# Patient Record
Sex: MALE | Race: WHITE | Employment: FULL TIME | URBAN - METROPOLITAN AREA
[De-identification: names, ages, dates, MRNs, and addresses within clinical notes are randomized per-mention and may not be internally consistent; named-entity substitution may affect disease eponyms.]

---

## 2022-01-14 ENCOUNTER — APPOINTMENT (OUTPATIENT)
Dept: CT IMAGING | Age: 57
End: 2022-01-14
Payer: COMMERCIAL

## 2022-01-14 ENCOUNTER — HOSPITAL ENCOUNTER (EMERGENCY)
Age: 57
Discharge: HOME | End: 2022-01-14
Payer: COMMERCIAL

## 2022-01-14 VITALS
HEART RATE: 90 BPM | RESPIRATION RATE: 16 BRPM | TEMPERATURE: 97.8 F | DIASTOLIC BLOOD PRESSURE: 88 MMHG | BODY MASS INDEX: 39.2 KG/M2 | SYSTOLIC BLOOD PRESSURE: 168 MMHG | HEIGHT: 71 IN | OXYGEN SATURATION: 98 % | WEIGHT: 280 LBS

## 2022-01-14 DIAGNOSIS — M79.18 ABDOMINAL MUSCLE PAIN: ICD-10-CM

## 2022-01-14 DIAGNOSIS — M79.89 MUSCLE SWELLING: Primary | ICD-10-CM

## 2022-01-14 LAB
ALBUMIN SERPL-MCNC: 3.7 G/DL (ref 3.5–5.2)
ALP BLD-CCNC: 79 U/L (ref 40–129)
ALT SERPL-CCNC: 22 U/L (ref 0–40)
ANION GAP SERPL CALCULATED.3IONS-SCNC: 11 MMOL/L (ref 7–16)
AST SERPL-CCNC: 20 U/L (ref 0–39)
BACTERIA: ABNORMAL /HPF
BASOPHILS ABSOLUTE: 0.04 E9/L (ref 0–0.2)
BASOPHILS RELATIVE PERCENT: 0.4 % (ref 0–2)
BILIRUB SERPL-MCNC: 0.3 MG/DL (ref 0–1.2)
BILIRUBIN URINE: NEGATIVE
BLOOD, URINE: NEGATIVE
BUN BLDV-MCNC: 8 MG/DL (ref 6–20)
CALCIUM SERPL-MCNC: 8.7 MG/DL (ref 8.6–10.2)
CHLORIDE BLD-SCNC: 102 MMOL/L (ref 98–107)
CLARITY: CLEAR
CO2: 24 MMOL/L (ref 22–29)
COLOR: YELLOW
CREAT SERPL-MCNC: 0.7 MG/DL (ref 0.7–1.2)
CRYSTALS, UA: ABNORMAL /HPF
EOSINOPHILS ABSOLUTE: 0.07 E9/L (ref 0.05–0.5)
EOSINOPHILS RELATIVE PERCENT: 0.7 % (ref 0–6)
EPITHELIAL CELLS, UA: ABNORMAL /HPF
GFR AFRICAN AMERICAN: >60
GFR NON-AFRICAN AMERICAN: >60 ML/MIN/1.73
GLUCOSE BLD-MCNC: 173 MG/DL (ref 74–99)
GLUCOSE URINE: 250 MG/DL
HCT VFR BLD CALC: 45.2 % (ref 37–54)
HEMOGLOBIN: 15.6 G/DL (ref 12.5–16.5)
IMMATURE GRANULOCYTES #: 0.06 E9/L
IMMATURE GRANULOCYTES %: 0.6 % (ref 0–5)
KETONES, URINE: NEGATIVE MG/DL
LEUKOCYTE ESTERASE, URINE: NEGATIVE
LIPASE: 15 U/L (ref 13–60)
LYMPHOCYTES ABSOLUTE: 2.77 E9/L (ref 1.5–4)
LYMPHOCYTES RELATIVE PERCENT: 26.1 % (ref 20–42)
MCH RBC QN AUTO: 29.5 PG (ref 26–35)
MCHC RBC AUTO-ENTMCNC: 34.5 % (ref 32–34.5)
MCV RBC AUTO: 85.4 FL (ref 80–99.9)
MONOCYTES ABSOLUTE: 0.83 E9/L (ref 0.1–0.95)
MONOCYTES RELATIVE PERCENT: 7.8 % (ref 2–12)
NEUTROPHILS ABSOLUTE: 6.86 E9/L (ref 1.8–7.3)
NEUTROPHILS RELATIVE PERCENT: 64.4 % (ref 43–80)
NITRITE, URINE: NEGATIVE
PDW BLD-RTO: 12.8 FL (ref 11.5–15)
PH UA: 6 (ref 5–9)
PLATELET # BLD: 211 E9/L (ref 130–450)
PMV BLD AUTO: 12.2 FL (ref 7–12)
POTASSIUM REFLEX MAGNESIUM: 3.7 MMOL/L (ref 3.5–5)
PROTEIN UA: 30 MG/DL
RBC # BLD: 5.29 E12/L (ref 3.8–5.8)
RBC UA: ABNORMAL /HPF (ref 0–2)
REASON FOR REJECTION: NORMAL
REJECTED TEST: NORMAL
SODIUM BLD-SCNC: 137 MMOL/L (ref 132–146)
SPECIFIC GRAVITY UA: >=1.03 (ref 1–1.03)
TOTAL CK: 181 U/L (ref 20–200)
TOTAL PROTEIN: 6.2 G/DL (ref 6.4–8.3)
UROBILINOGEN, URINE: 0.2 E.U./DL
WBC # BLD: 10.6 E9/L (ref 4.5–11.5)
WBC UA: ABNORMAL /HPF (ref 0–5)

## 2022-01-14 PROCEDURE — 82550 ASSAY OF CK (CPK): CPT

## 2022-01-14 PROCEDURE — 81001 URINALYSIS AUTO W/SCOPE: CPT

## 2022-01-14 PROCEDURE — 6360000004 HC RX CONTRAST MEDICATION: Performed by: RADIOLOGY

## 2022-01-14 PROCEDURE — 83690 ASSAY OF LIPASE: CPT

## 2022-01-14 PROCEDURE — 36415 COLL VENOUS BLD VENIPUNCTURE: CPT

## 2022-01-14 PROCEDURE — 80053 COMPREHEN METABOLIC PANEL: CPT

## 2022-01-14 PROCEDURE — 99282 EMERGENCY DEPT VISIT SF MDM: CPT

## 2022-01-14 PROCEDURE — 74177 CT ABD & PELVIS W/CONTRAST: CPT

## 2022-01-14 PROCEDURE — 85025 COMPLETE CBC W/AUTO DIFF WBC: CPT

## 2022-01-14 PROCEDURE — 96360 HYDRATION IV INFUSION INIT: CPT

## 2022-01-14 PROCEDURE — 2580000003 HC RX 258: Performed by: PHYSICIAN ASSISTANT

## 2022-01-14 RX ORDER — 0.9 % SODIUM CHLORIDE 0.9 %
1000 INTRAVENOUS SOLUTION INTRAVENOUS ONCE
Status: COMPLETED | OUTPATIENT
Start: 2022-01-14 | End: 2022-01-14

## 2022-01-14 RX ADMIN — IOPAMIDOL 75 ML: 755 INJECTION, SOLUTION INTRAVENOUS at 22:35

## 2022-01-14 RX ADMIN — SODIUM CHLORIDE 1000 ML: 9 INJECTION, SOLUTION INTRAVENOUS at 20:32

## 2022-01-14 ASSESSMENT — PAIN DESCRIPTION - LOCATION: LOCATION: ABDOMEN

## 2022-01-14 ASSESSMENT — PAIN DESCRIPTION - PAIN TYPE: TYPE: ACUTE PAIN

## 2022-01-14 ASSESSMENT — PAIN SCALES - GENERAL: PAINLEVEL_OUTOF10: 1

## 2022-01-14 ASSESSMENT — PAIN DESCRIPTION - ORIENTATION: ORIENTATION: LEFT

## 2022-01-14 ASSESSMENT — PAIN DESCRIPTION - FREQUENCY: FREQUENCY: INTERMITTENT

## 2022-01-14 ASSESSMENT — PAIN DESCRIPTION - DESCRIPTORS: DESCRIPTORS: BURNING

## 2022-01-15 NOTE — ED PROVIDER NOTES
been reviewed. Allergies: Patient has no known allergies.     -------------------------------------------------- RESULTS -------------------------------------------------  All laboratory and radiology results have been personally reviewed by myself   LABS:  Results for orders placed or performed during the hospital encounter of 01/14/22   CBC auto differential   Result Value Ref Range    WBC 10.6 4.5 - 11.5 E9/L    RBC 5.29 3.80 - 5.80 E12/L    Hemoglobin 15.6 12.5 - 16.5 g/dL    Hematocrit 45.2 37.0 - 54.0 %    MCV 85.4 80.0 - 99.9 fL    MCH 29.5 26.0 - 35.0 pg    MCHC 34.5 32.0 - 34.5 %    RDW 12.8 11.5 - 15.0 fL    Platelets 932 881 - 785 E9/L    MPV 12.2 (H) 7.0 - 12.0 fL    Neutrophils % 64.4 43.0 - 80.0 %    Immature Granulocytes % 0.6 0.0 - 5.0 %    Lymphocytes % 26.1 20.0 - 42.0 %    Monocytes % 7.8 2.0 - 12.0 %    Eosinophils % 0.7 0.0 - 6.0 %    Basophils % 0.4 0.0 - 2.0 %    Neutrophils Absolute 6.86 1.80 - 7.30 E9/L    Immature Granulocytes # 0.06 E9/L    Lymphocytes Absolute 2.77 1.50 - 4.00 E9/L    Monocytes Absolute 0.83 0.10 - 0.95 E9/L    Eosinophils Absolute 0.07 0.05 - 0.50 E9/L    Basophils Absolute 0.04 0.00 - 0.20 E9/L   Urinalysis with Microscopic   Result Value Ref Range    Color, UA Yellow Straw/Yellow    Clarity, UA Clear Clear    Glucose, Ur 250 (A) Negative mg/dL    Bilirubin Urine Negative Negative    Ketones, Urine Negative Negative mg/dL    Specific Gravity, UA >=1.030 1.005 - 1.030    Blood, Urine Negative Negative    pH, UA 6.0 5.0 - 9.0    Protein, UA 30 (A) Negative mg/dL    Urobilinogen, Urine 0.2 <2.0 E.U./dL    Nitrite, Urine Negative Negative    Leukocyte Esterase, Urine Negative Negative    WBC, UA NONE 0 - 5 /HPF    RBC, UA NONE 0 - 2 /HPF    Epithelial Cells, UA RARE /HPF    Bacteria, UA NONE SEEN None Seen /HPF    Crystals, UA Few (A) None Seen /HPF   Comprehensive Metabolic Panel w/ Reflex to MG   Result Value Ref Range    Sodium 137 132 - 146 mmol/L    Potassium reflex Magnesium 3.7 3.5 - 5.0 mmol/L    Chloride 102 98 - 107 mmol/L    CO2 24 22 - 29 mmol/L    Anion Gap 11 7 - 16 mmol/L    Glucose 173 (H) 74 - 99 mg/dL    BUN 8 6 - 20 mg/dL    CREATININE 0.7 0.7 - 1.2 mg/dL    GFR Non-African American >60 >=60 mL/min/1.73    GFR African American >60     Calcium 8.7 8.6 - 10.2 mg/dL    Total Protein 6.2 (L) 6.4 - 8.3 g/dL    Albumin 3.7 3.5 - 5.2 g/dL    Total Bilirubin 0.3 0.0 - 1.2 mg/dL    Alkaline Phosphatase 79 40 - 129 U/L    ALT 22 0 - 40 U/L    AST 20 0 - 39 U/L   Lipase   Result Value Ref Range    Lipase 15 13 - 60 U/L   CK   Result Value Ref Range    Total  20 - 200 U/L   SPECIMEN REJECTION   Result Value Ref Range    Rejected Test cmp,ck,lipas     Reason for Rejection see below        RADIOLOGY:  Interpreted by Radiologist.  CT ABDOMEN PELVIS W IV CONTRAST Additional Contrast? None   Final Result   No acute abnormality identified. RECOMMENDATIONS:   Unavailable             ------------------------- NURSING NOTES AND VITALS REVIEWED ---------------------------   The nursing notes within the ED encounter and vital signs as below have been reviewed. BP (!) 168/88   Pulse 90   Temp 97.8 °F (36.6 °C) (Infrared)   Resp 16   Ht 5' 11\" (1.803 m)   Wt 280 lb (127 kg)   SpO2 98%   BMI 39.05 kg/m²   Oxygen Saturation Interpretation: Normal      ---------------------------------------------------PHYSICAL EXAM--------------------------------------      Constitutional/General: Alert and oriented x3, stable appearing, non toxic in NAD  Head: Normocephalic and atraumatic  Eyes: PERRL, EOMI, conjunctiva pink, no scleral icterus. Mouth: Oropharynx clear, handling secretions, no trismus  Neck: Supple, full ROM  Pulmonary: Lungs clear to auscultation bilaterally, no wheezes, rales, or rhonchi.  Not in respiratory distress  Cardiovascular:  Regular rate and rhythm, 2+ distal pulses  Abdomen: Soft, obese, on visual inspection from front of abdomen, noticeable area of STS from left lateral side of abdomen in comparison to right. Area with minimal local tenderness, no palpable mass. No CVA tenderness posteriorly bilaterally. No tenderness to mid suprapubic area or groin region. Extremities: Moves all extremities x 4. No pitting edema to bilateral ankles. Warm and well perfused  Skin: warm and dry without rash  Neurologic: GCS 15  Psych: Normal Affect    ------------------------------ ED COURSE/MEDICAL DECISION MAKING----------------------  Medications   0.9 % sodium chloride bolus (0 mLs IntraVENous Stopped 1/14/22 2126)   iopamidol (ISOVUE-370) 76 % injection 75 mL (75 mLs IntraVENous Given 1/14/22 2235)       ED COURSE:       Medical Decision Making:    Patient to ER, complaints of swelling to left lateral abdomen, patient recalls onset since December when [place] Patient advised of need to check labs as well as urine and CT imaging of his abdomen and pelvis. Patient having no issues with his bowel or bladder. No problems with flank pain or visible blood in his urine. Patient advised of stable labs and urine, he does admit to eating 8 italian cookies prior to coming into ER as he had nothing to eat or drink all day. Patient awaiting CT to be completed. Patient lab had to be redrawn as it was hemolyzed. Patient otherwise resting comfortably, no complaints of pain. Patient CT results noted and stable, reviewed with ER attending Dr. Danielle Santiago who saw patient. Appears that with patient recent large amount of weight loss, most likely patient loss of weight, needs to work on core of muscle of his abdomen to firm of the muscle and most likely he most likely strained the muscles of his lateral abdomen and this is why the area is swollen, but patient advised of stable CT findings.   Patient to see PCP for follow up and workers compensation paperwork presented and filled out for patient and patient ok to work full duty.      Counseling: The emergency provider has spoken with the patient and discussed todays results, in addition to providing specific details for the plan of care and counseling regarding the diagnosis and prognosis. Questions are answered at this time and they are agreeable with the plan.      --------------------------------- IMPRESSION AND DISPOSITION ---------------------------------    IMPRESSION  1. Muscle swelling    2. Abdominal muscle pain        DISPOSITION  Disposition: Discharge to home  Patient condition is stable      NOTE: This report was transcribed using voice recognition software.  Every effort was made to ensure accuracy; however, inadvertent computerized transcription errors may be present       Malcolm Monroe PA-C  01/14/22 6168

## 2022-01-30 PROCEDURE — 99284 EMERGENCY DEPT VISIT MOD MDM: CPT

## 2022-01-30 PROCEDURE — 96365 THER/PROPH/DIAG IV INF INIT: CPT

## 2022-01-31 ENCOUNTER — APPOINTMENT (OUTPATIENT)
Dept: GENERAL RADIOLOGY | Age: 57
DRG: 918 | End: 2022-01-31

## 2022-01-31 ENCOUNTER — APPOINTMENT (OUTPATIENT)
Dept: CT IMAGING | Age: 57
DRG: 918 | End: 2022-01-31

## 2022-01-31 ENCOUNTER — HOSPITAL ENCOUNTER (INPATIENT)
Age: 57
LOS: 1 days | Discharge: HOME | DRG: 918 | End: 2022-01-31
Attending: EMERGENCY MEDICINE | Admitting: FAMILY MEDICINE

## 2022-01-31 VITALS
HEART RATE: 67 BPM | OXYGEN SATURATION: 99 % | SYSTOLIC BLOOD PRESSURE: 143 MMHG | WEIGHT: 290 LBS | RESPIRATION RATE: 18 BRPM | DIASTOLIC BLOOD PRESSURE: 81 MMHG | HEIGHT: 71 IN | BODY MASS INDEX: 40.6 KG/M2 | TEMPERATURE: 97.9 F

## 2022-01-31 DIAGNOSIS — I48.92 ATRIAL FLUTTER WITH RAPID VENTRICULAR RESPONSE (HCC): ICD-10-CM

## 2022-01-31 DIAGNOSIS — T50.901A ACCIDENTAL DRUG INGESTION, INITIAL ENCOUNTER: ICD-10-CM

## 2022-01-31 DIAGNOSIS — M25.512 ACUTE PAIN OF LEFT SHOULDER: Primary | ICD-10-CM

## 2022-01-31 DIAGNOSIS — R20.0 NUMBNESS: ICD-10-CM

## 2022-01-31 PROBLEM — I48.91 ATRIAL FIBRILLATION WITH RVR (HCC): Status: ACTIVE | Noted: 2022-01-31

## 2022-01-31 LAB
ALBUMIN SERPL-MCNC: 4.3 G/DL (ref 3.5–5.2)
ALP BLD-CCNC: 95 U/L (ref 40–129)
ALT SERPL-CCNC: 20 U/L (ref 0–40)
AMPHETAMINE SCREEN, URINE: POSITIVE
ANION GAP SERPL CALCULATED.3IONS-SCNC: 14 MMOL/L (ref 7–16)
APTT: 32.4 SEC (ref 24.5–35.1)
AST SERPL-CCNC: 21 U/L (ref 0–39)
BARBITURATE SCREEN URINE: NOT DETECTED
BASOPHILS ABSOLUTE: 0.06 E9/L (ref 0–0.2)
BASOPHILS RELATIVE PERCENT: 0.5 % (ref 0–2)
BENZODIAZEPINE SCREEN, URINE: NOT DETECTED
BILIRUB SERPL-MCNC: 0.6 MG/DL (ref 0–1.2)
BUN BLDV-MCNC: 9 MG/DL (ref 6–20)
CALCIUM SERPL-MCNC: 9.7 MG/DL (ref 8.6–10.2)
CANNABINOID SCREEN URINE: POSITIVE
CHLORIDE BLD-SCNC: 96 MMOL/L (ref 98–107)
CO2: 25 MMOL/L (ref 22–29)
COCAINE METABOLITE SCREEN URINE: NOT DETECTED
CREAT SERPL-MCNC: 0.7 MG/DL (ref 0.7–1.2)
D DIMER: <200 NG/ML DDU
EKG ATRIAL RATE: 267 BPM
EKG ATRIAL RATE: 50 BPM
EKG P AXIS: -94 DEGREES
EKG P AXIS: 11 DEGREES
EKG P-R INTERVAL: 124 MS
EKG Q-T INTERVAL: 358 MS
EKG Q-T INTERVAL: 478 MS
EKG QRS DURATION: 92 MS
EKG QRS DURATION: 98 MS
EKG QTC CALCULATION (BAZETT): 435 MS
EKG QTC CALCULATION (BAZETT): 537 MS
EKG R AXIS: -69 DEGREES
EKG R AXIS: 65 DEGREES
EKG T AXIS: -19 DEGREES
EKG T AXIS: 62 DEGREES
EKG VENTRICULAR RATE: 135 BPM
EKG VENTRICULAR RATE: 50 BPM
EOSINOPHILS ABSOLUTE: 0.09 E9/L (ref 0.05–0.5)
EOSINOPHILS RELATIVE PERCENT: 0.8 % (ref 0–6)
FENTANYL SCREEN, URINE: NOT DETECTED
GFR AFRICAN AMERICAN: >60
GFR NON-AFRICAN AMERICAN: >60 ML/MIN/1.73
GLUCOSE BLD-MCNC: 191 MG/DL (ref 74–99)
HCT VFR BLD CALC: 50.3 % (ref 37–54)
HEMOGLOBIN: 17 G/DL (ref 12.5–16.5)
IMMATURE GRANULOCYTES #: 0.05 E9/L
IMMATURE GRANULOCYTES %: 0.4 % (ref 0–5)
INR BLD: 1.1
LV EF: 60 %
LVEF MODALITY: NORMAL
LYMPHOCYTES ABSOLUTE: 3.03 E9/L (ref 1.5–4)
LYMPHOCYTES RELATIVE PERCENT: 25.3 % (ref 20–42)
Lab: ABNORMAL
MCH RBC QN AUTO: 28.9 PG (ref 26–35)
MCHC RBC AUTO-ENTMCNC: 33.8 % (ref 32–34.5)
MCV RBC AUTO: 85.4 FL (ref 80–99.9)
METHADONE SCREEN, URINE: NOT DETECTED
MONOCYTES ABSOLUTE: 0.92 E9/L (ref 0.1–0.95)
MONOCYTES RELATIVE PERCENT: 7.7 % (ref 2–12)
NEUTROPHILS ABSOLUTE: 7.83 E9/L (ref 1.8–7.3)
NEUTROPHILS RELATIVE PERCENT: 65.3 % (ref 43–80)
OPIATE SCREEN URINE: NOT DETECTED
OXYCODONE URINE: NOT DETECTED
PDW BLD-RTO: 12.5 FL (ref 11.5–15)
PHENCYCLIDINE SCREEN URINE: NOT DETECTED
PLATELET # BLD: 232 E9/L (ref 130–450)
PMV BLD AUTO: 10.8 FL (ref 7–12)
POTASSIUM SERPL-SCNC: 3.8 MMOL/L (ref 3.5–5)
PROTHROMBIN TIME: 11.8 SEC (ref 9.3–12.4)
RBC # BLD: 5.89 E12/L (ref 3.8–5.8)
SARS-COV-2, NAAT: NOT DETECTED
SODIUM BLD-SCNC: 135 MMOL/L (ref 132–146)
TOTAL PROTEIN: 7.8 G/DL (ref 6.4–8.3)
TROPONIN, HIGH SENSITIVITY: 23 NG/L (ref 0–11)
WBC # BLD: 12 E9/L (ref 4.5–11.5)

## 2022-01-31 PROCEDURE — 2500000003 HC RX 250 WO HCPCS: Performed by: NURSE PRACTITIONER

## 2022-01-31 PROCEDURE — 93005 ELECTROCARDIOGRAM TRACING: CPT | Performed by: INTERNAL MEDICINE

## 2022-01-31 PROCEDURE — 2580000003 HC RX 258: Performed by: FAMILY MEDICINE

## 2022-01-31 PROCEDURE — 99284 EMERGENCY DEPT VISIT MOD MDM: CPT | Performed by: INTERNAL MEDICINE

## 2022-01-31 PROCEDURE — 85610 PROTHROMBIN TIME: CPT

## 2022-01-31 PROCEDURE — 84484 ASSAY OF TROPONIN QUANT: CPT

## 2022-01-31 PROCEDURE — 6360000002 HC RX W HCPCS: Performed by: FAMILY MEDICINE

## 2022-01-31 PROCEDURE — 71045 X-RAY EXAM CHEST 1 VIEW: CPT

## 2022-01-31 PROCEDURE — 6370000000 HC RX 637 (ALT 250 FOR IP): Performed by: INTERNAL MEDICINE

## 2022-01-31 PROCEDURE — 6360000004 HC RX CONTRAST MEDICATION: Performed by: RADIOLOGY

## 2022-01-31 PROCEDURE — 2500000003 HC RX 250 WO HCPCS: Performed by: EMERGENCY MEDICINE

## 2022-01-31 PROCEDURE — 93306 TTE W/DOPPLER COMPLETE: CPT

## 2022-01-31 PROCEDURE — 93005 ELECTROCARDIOGRAM TRACING: CPT | Performed by: NURSE PRACTITIONER

## 2022-01-31 PROCEDURE — 87635 SARS-COV-2 COVID-19 AMP PRB: CPT

## 2022-01-31 PROCEDURE — 85378 FIBRIN DEGRADE SEMIQUANT: CPT

## 2022-01-31 PROCEDURE — 73030 X-RAY EXAM OF SHOULDER: CPT

## 2022-01-31 PROCEDURE — 85025 COMPLETE CBC W/AUTO DIFF WBC: CPT

## 2022-01-31 PROCEDURE — 71275 CT ANGIOGRAPHY CHEST: CPT

## 2022-01-31 PROCEDURE — 72125 CT NECK SPINE W/O DYE: CPT

## 2022-01-31 PROCEDURE — 93010 ELECTROCARDIOGRAM REPORT: CPT | Performed by: INTERNAL MEDICINE

## 2022-01-31 PROCEDURE — 80053 COMPREHEN METABOLIC PANEL: CPT

## 2022-01-31 PROCEDURE — 80307 DRUG TEST PRSMV CHEM ANLYZR: CPT

## 2022-01-31 PROCEDURE — 2580000003 HC RX 258: Performed by: NURSE PRACTITIONER

## 2022-01-31 PROCEDURE — 85730 THROMBOPLASTIN TIME PARTIAL: CPT

## 2022-01-31 PROCEDURE — 1200000000 HC SEMI PRIVATE

## 2022-01-31 RX ORDER — ACETAMINOPHEN 325 MG/1
650 TABLET ORAL EVERY 6 HOURS PRN
Status: DISCONTINUED | OUTPATIENT
Start: 2022-01-31 | End: 2022-01-31 | Stop reason: HOSPADM

## 2022-01-31 RX ORDER — LOSARTAN POTASSIUM 25 MG/1
25 TABLET ORAL DAILY
Status: DISCONTINUED | OUTPATIENT
Start: 2022-01-31 | End: 2022-01-31

## 2022-01-31 RX ORDER — POLYETHYLENE GLYCOL 3350 17 G/17G
17 POWDER, FOR SOLUTION ORAL DAILY PRN
Status: DISCONTINUED | OUTPATIENT
Start: 2022-01-31 | End: 2022-01-31 | Stop reason: HOSPADM

## 2022-01-31 RX ORDER — SODIUM CHLORIDE 9 MG/ML
25 INJECTION, SOLUTION INTRAVENOUS PRN
Status: DISCONTINUED | OUTPATIENT
Start: 2022-01-31 | End: 2022-01-31 | Stop reason: HOSPADM

## 2022-01-31 RX ORDER — ONDANSETRON 2 MG/ML
4 INJECTION INTRAMUSCULAR; INTRAVENOUS EVERY 6 HOURS PRN
Status: DISCONTINUED | OUTPATIENT
Start: 2022-01-31 | End: 2022-01-31 | Stop reason: HOSPADM

## 2022-01-31 RX ORDER — SODIUM CHLORIDE 0.9 % (FLUSH) 0.9 %
10 SYRINGE (ML) INJECTION PRN
Status: DISCONTINUED | OUTPATIENT
Start: 2022-01-31 | End: 2022-01-31 | Stop reason: HOSPADM

## 2022-01-31 RX ORDER — SODIUM CHLORIDE 0.9 % (FLUSH) 0.9 %
10 SYRINGE (ML) INJECTION EVERY 12 HOURS SCHEDULED
Status: DISCONTINUED | OUTPATIENT
Start: 2022-01-31 | End: 2022-01-31 | Stop reason: HOSPADM

## 2022-01-31 RX ORDER — PROMETHAZINE HYDROCHLORIDE 25 MG/1
12.5 TABLET ORAL EVERY 6 HOURS PRN
Status: DISCONTINUED | OUTPATIENT
Start: 2022-01-31 | End: 2022-01-31 | Stop reason: HOSPADM

## 2022-01-31 RX ORDER — ACETAMINOPHEN 650 MG/1
650 SUPPOSITORY RECTAL EVERY 6 HOURS PRN
Status: DISCONTINUED | OUTPATIENT
Start: 2022-01-31 | End: 2022-01-31 | Stop reason: HOSPADM

## 2022-01-31 RX ADMIN — DEXTROSE MONOHYDRATE 5 MG/HR: 50 INJECTION, SOLUTION INTRAVENOUS at 03:06

## 2022-01-31 RX ADMIN — IOPAMIDOL 75 ML: 755 INJECTION, SOLUTION INTRAVENOUS at 01:53

## 2022-01-31 RX ADMIN — Medication 10 ML: at 08:33

## 2022-01-31 RX ADMIN — METOPROLOL TARTRATE 25 MG: 25 TABLET, FILM COATED ORAL at 08:32

## 2022-01-31 RX ADMIN — ENOXAPARIN SODIUM 135 MG: 150 INJECTION SUBCUTANEOUS at 12:37

## 2022-01-31 RX ADMIN — DILTIAZEM HYDROCHLORIDE 25 MG: 5 INJECTION INTRAVENOUS at 00:29

## 2022-01-31 NOTE — PROGRESS NOTES
CLINICAL PHARMACY NOTE: MEDS TO BEDS    Total # of Prescriptions Filled: 1   The following medications were delivered to the patient:  · metporolol tartrate 25     Additional Documentation:

## 2022-01-31 NOTE — H&P
(Oral)   Resp 19   Ht 5' 11\" (1.803 m)   Wt 290 lb (131.5 kg)   SpO2 98%   BMI 40.45 kg/m²   General appearance: No apparent distress, appears stated age and cooperative. HEENT: Normal cephalic, atraumatic without obvious deformity. Pupils equal, round, and reactive to light. Extra ocular muscles intact. Conjunctivae/corneas clear. Neck: Supple, with full range of motion. No jugular venous distention. Trachea midline. Respiratory: Clear to auscultation bilaterally  Cardiovascular: Tachycardic, irregularly irregular  Abdomen: Soft, nontender, nondistended  Musculoskeletal: No clubbing, cyanosis. Brisk capillary refill. Skin: Normal skin color. No rashes or lesions. Neurologic:  Neurovascularly intact without any focal sensory/motor deficits. Cranial nerves: II-XII intact, grossly non-focal.  Psychiatric: Alert and oriented, thought content appropriate, normal insight    Reviewed EKG and CXR personally    CBC:   Recent Labs     01/31/22 0028   WBC 12.0*   RBC 5.89*   HGB 17.0*   HCT 50.3   MCV 85.4   RDW 12.5        BMP:   Recent Labs     01/31/22 0028      K 3.8   CL 96*   CO2 25   BUN 9   CREATININE 0.7     LFT:  Recent Labs     01/31/22 0028   PROT 7.8   ALKPHOS 95   ALT 20   AST 21   BILITOT 0.6     CE:  No results for input(s): Emy Federico in the last 72 hours. PT/INR:   Recent Labs     01/31/22 0028   INR 1.1   APTT 32.4     BNP: No results for input(s): BNP in the last 72 hours.   ESR: No results found for: SEDRATE  CRP: No results found for: CRP  D Dimer:   Lab Results   Component Value Date    DDIMER <200 01/31/2022      Folate and B12: No results found for: Cherylyn Gigi, No results found for: FOLATE  Lactic Acid: No results found for: LACTA  Thyroid Studies:   Lab Results   Component Value Date    TSH 1.200 06/21/2017       Oupatient labs:  Lab Results   Component Value Date    CHOL 113 11/06/2017    TRIG 351 (H) 11/06/2017    HDL 24 11/06/2017    LDLCALC 19 11/06/2017 TSH 1.200 06/21/2017    PSA SEE NOTE (AA) 02/28/2017    PSA 0.26 02/28/2017    INR 1.1 01/31/2022    LABA1C 9.7 (H) 11/06/2017       Urinalysis:    Lab Results   Component Value Date    NITRU Negative 01/14/2022    WBCUA NONE 01/14/2022    WBCUA 0-1 09/14/2011    BACTERIA NONE SEEN 01/14/2022    RBCUA NONE 01/14/2022    RBCUA 5-10 01/08/2013    BLOODU Negative 01/14/2022    SPECGRAV >=1.030 01/14/2022    GLUCOSEU 250 01/14/2022    GLUCOSEU NEGATIVE 09/14/2011       Imaging:  XR SHOULDER RIGHT (MIN 2 VIEWS)    Result Date: 1/31/2022  EXAMINATION: THREE XRAY VIEWS OF THE RIGHT SHOULDER 1/31/2022 12:42 am COMPARISON: None. HISTORY: ORDERING SYSTEM PROVIDED HISTORY: pain TECHNOLOGIST PROVIDED HISTORY: Reason for exam:->pain What reading provider will be dictating this exam?->CRC FINDINGS: Glenohumeral joint is normally aligned. No evidence of acute fracture or dislocation. No abnormal periarticular calcifications. AC joint degenerative changes. . Visualized lung is unremarkable. No acute abnormality. AC joint degenerative changes. CT CERVICAL SPINE WO CONTRAST    Result Date: 1/31/2022  EXAMINATION: CT OF THE CERVICAL SPINE WITHOUT CONTRAST 1/31/2022 1:50 am TECHNIQUE: CT of the cervical spine was performed without the administration of intravenous contrast. Multiplanar reformatted images are provided for review. Dose modulation, iterative reconstruction, and/or weight based adjustment of the mA/kV was utilized to reduce the radiation dose to as low as reasonably achievable. COMPARISON: 10/22/2017 HISTORY: ORDERING SYSTEM PROVIDED HISTORY: pain TECHNOLOGIST PROVIDED HISTORY: Reason for exam:->pain Decision Support Exception - unselect if not a suspected or confirmed emergency medical condition->Emergency Medical Condition (MA) What reading provider will be dictating this exam?->CRC No other provided history. FINDINGS: BONES/ALIGNMENT: There is no acute fracture or traumatic malalignment.  DEGENERATIVE CHANGES: Moderate diffuse degenerative disc disease changes with disc space narrowing and marginal osteophytes greatest at the C6-7 level. There is a large spondylotic bar involving the posterior aspect of C4 extending vertically posterior to the C3 vertebral body. This contributes to moderately severe central canal narrowing and is similar to previous. Moderate diffuse facet arthritis. The hypertrophic changes contribute to at least mild-moderate multilevel canal and foraminal narrowing otherwise. Minimal ligamentous nuchal calcification. SOFT TISSUES: No prevertebral edema or focal fluid collection. Scattered vascular calcifications. Thyroid is mildly enlarged but homogeneous. Degenerative changes and chronic appearing findings as detailed above. MRI would be useful if symptoms persist. RECOMMENDATIONS: Unavailable     CT ABDOMEN PELVIS W IV CONTRAST Additional Contrast? None    Result Date: 1/14/2022  EXAMINATION: CT OF THE ABDOMEN AND PELVIS WITH CONTRAST 1/14/2022 10:26 pm TECHNIQUE: CT of the abdomen and pelvis was performed with the administration of intravenous contrast. Multiplanar reformatted images are provided for review. Dose modulation, iterative reconstruction, and/or weight based adjustment of the mA/kV was utilized to reduce the radiation dose to as low as reasonably achievable. COMPARISON: None. HISTORY: ORDERING SYSTEM PROVIDED HISTORY: left lateral abdominal swelling, getting worse over the last few weeks, began after he was attempting to pull and lift a heavy casket TECHNOLOGIST PROVIDED HISTORY: Reason for exam:->left lateral abdominal swelling, getting worse over the last few weeks, began after he was attempting to pull and lift a heavy casket Additional Contrast?->None Decision Support Exception - unselect if not a suspected or confirmed emergency medical condition->Emergency Medical Condition (MA) FINDINGS: Lower Chest: The visualized portions of the lung bases are clear. Organs:  The visualized liver, spleen, pancreas, adrenal glands and kidneys demonstrate no significant abnormality. GI/Bowel: There are no findings of intestinal obstruction. The appendix is normal.  Gallbladder appears grossly unremarkable. Pelvis: Bladder is unremarkable in appearance. There is no abnormal pelvic mass or fluid collection seen. Peritoneum/Retroperitoneum: A There are aortoiliac atherosclerotic calcification. There is no abdominal aortic aneurysm. There is no abnormal fluid collection. There is no free intraperitoneal air. Bones/Soft Tissues: The no acute abnormality seen. No abdominal wall hernia or other finding to account for clinical concern left lateral abdominal swelling. No acute abnormality identified. RECOMMENDATIONS: Unavailable     XR SHOULDER LEFT (MIN 2 VIEWS)    Result Date: 1/31/2022  EXAMINATION: THREE XRAY VIEWS OF THE LEFT SHOULDER 1/31/2022 12:42 am COMPARISON: None. HISTORY: ORDERING SYSTEM PROVIDED HISTORY: pain TECHNOLOGIST PROVIDED HISTORY: Reason for exam:->pain What reading provider will be dictating this exam?->CRC FINDINGS: Moderate degenerative changes of the acromioclavicular joint with some undersurface spurring of the distal clavicle and acromion. Mild degenerative change of the glenohumeral joint. Focus of sclerosis within the head of the humerus is thought to be incidental and is unchanged compared with chest CT dated 06/24/2016. This may represent a small bone island or bone infarct. Tiny enchondroma could also be a consideration. No dislocation or acute fracture. Chronic appearing findings with degenerative changes most evident in the acromioclavicular joint.   MRI would be useful if symptoms persist.     XR CHEST PORTABLE    Result Date: 1/31/2022  EXAMINATION: ONE XRAY VIEW OF THE CHEST 1/31/2022 12:42 am COMPARISON: 06/24/2016 HISTORY: ORDERING SYSTEM PROVIDED HISTORY: aflutter, recent travel TECHNOLOGIST PROVIDED HISTORY: Reason for exam:->aflutter, recent travel What reading provider will be dictating this exam?->CRC FINDINGS: EKG leads overlie the chest.  Heart size is normal.  Lung volumes are slightly diminished. Scattered vascular calcifications. No pneumothorax. No focal consolidation. No effusion. Degenerative changes are present in the spine and shoulders. No significant change. PA and lateral views would be useful for further assessment, if symptoms persist.     CTA CHEST W CONTRAST    Result Date: 1/31/2022  EXAMINATION: CTA OF THE CHEST 1/31/2022 1:50 am TECHNIQUE: CTA of the chest was performed after the administration of intravenous contrast.  Multiplanar reformatted images are provided for review. MIP images are provided for review. Dose modulation, iterative reconstruction, and/or weight based adjustment of the mA/kV was utilized to reduce the radiation dose to as low as reasonably achievable. COMPARISON: None. HISTORY: ORDERING SYSTEM PROVIDED HISTORY: r/o PE FINDINGS: Pulmonary Arteries: Pulmonary arteries are adequately opacified for evaluation. No evidence of intraluminal filling defect to suggest pulmonary embolism. Main pulmonary artery is normal in caliber. Mediastinum: No evidence of mediastinal lymphadenopathy. The heart and pericardium demonstrate no acute abnormality. There is no acute abnormality of the thoracic aorta. Lungs/pleura: The lungs are without acute process. No focal consolidation or pulmonary edema. No evidence of pleural effusion or pneumothorax. Upper Abdomen: Limited images of the upper abdomen are unremarkable. Soft Tissues/Bones: No acute bone or soft tissue abnormality. No evidence of pulmonary embolism or acute pulmonary abnormality.        ASSESSMENT:  -New onset atrial flutter with RVR  -Untreated hypertension      PLAN:  -Admit to medicine  -Consult cardiology  -Diltiazem infusion  -Lovenox 1 mg/kg twice daily  -Start losartan 25 mg daily  -Telemetry        Diet: No diet orders on file  Code Status: Prior    Surrogate decision maker confirmed with patient:  Primary Emergency Contact: Martina Goel, Home Phone: [phone]    DVT Prophylaxis: []Lovenox []Heparin []PCD [] [address]  []Encouraged ambulation    Disposition: []Med/Surg [] Intermediate [] ICU/CCU  Admit status: [] Observation [] Inpatient     NOTE: This report was transcribed using voice recognition software. Every effort was made to ensure accuracy; however, inadvertent computerized transcription errors may be present.

## 2022-01-31 NOTE — Clinical Note
Patient Class: Inpatient [101]   REQUIRED: Diagnosis: Atrial fibrillation with RVR ([facility] [address] [[id number]]   Estimated Length of Stay: Estimated stay of more than 2 midnights

## 2022-01-31 NOTE — ED NOTES
Assumed care of pt at this time     Matias Tellez, MAYE  01/31/22 0039
Bed: 14A-14  Expected date:   Expected time:   Means of arrival:   Comments:  Room [address] [place] [address]  01/31/22 2096
Cardiology NP has been paged to let them know that cardizem drip has been stopped and pts current HR is 50. Awaiting further orders from Cardiology. Pt resting comfortably at this time.       Emma Mcgee RN  01/31/22 4264
Department of Emergency Medicine  FIRST PROVIDER TRIAGE NOTE             Independent MLP           22  12:07 AM EST    Date of Encounter: 22   MRN: [id number]      HPI: French Vu is a 61 y.o. male who presents to the ED for Shoulder Pain (left sided shoulder pain onset post injury 12/15. )  Patient presents to emergency department with neck pain as well as bilateral shoulder pain from injury from December 15. States he works for a  home and had to  a very heavy body did not realize the box was over 400 pounds. Patient to be tachycardic here heart rate 140s, he states that he just drove 600 miles from [address] [place] [address] to here. He denies any chest pain or shortness of breath. ROS: Negative for cp or sob. PE: Gen Appearance/Constitutional: alert  HEENT: NC/NT. PERRLA,  Airway patent. Initial Plan of Care: All treatment areas with department are currently occupied. Plan to order/Initiate the following while awaiting opening in ED: labs, EKG and imaging studies.     Initial Plan of Care: Initiate Treatment-Testing, Proceed toTreatment Area When Bed Available for ED Attending/MLP to Continue Care    Electronically signed by PHILOMENA Riley CNP   DD: 22         PHILOMENA Riley CNP  22 [phone]    ATTENDING PROVIDER ATTESTATION:     Supervising Physician, on-site, available for consultation, non-participatory in the evaluation or care of this patient         Dyana Councilman, MD  22 [phone]
Dr. Angela Bravo is aware of pts current HR of 49. Verbal order by Dr. Angela Bravo for repeat EKG and  to discontinue active Cardizem drip order. Dr Angela Bravo made aware that cardizem drip was already stopped prior to talking with him. Dr. Angela Bravo also aware that pt did receive morning dose of Metoprolol. NO further orders at this time. This nurse will continue to monitor pt.       [place] RN  01/31/22 MAYE Griffin  01/31/22 7686
Dr. Edson Ross paged to make aware that pts heart rate is 50. Dr. Edson Ross made aware that cardizem drip has been discontinued. Pt currently asymptomatic. This nurse is awaiting orders form Dr. Edson Ross now.       Emma Mcgee RN  01/31/22 2327
Dr. Henry Eaton has been perfect served regarding this patient's discharge. Awaiting further instruction.      Moreno Duncan, RN  01/31/22 [address], RN  01/31/22 4856
Dr. Ilsa Yeager from cardiology called this nurse and sts that pt may be discharged home from his standpoint. Per Dr. Ilsa Yeager, \"pt should continue beta blocker on discharge but no anticoagulant is necessary at this time. \" This nurse will page admitting doctor to update them. No further orders at this time.        Arlyne Frankel, RN  01/31/22 9239
Dr. Shakeel Tracy made aware that repeat EKG has been complete and is in chart.       Maryam Varela RN  01/31/22 3173
Dr. Tracy Buckley has been made aware of HR and sts cardiology needs made aware about stopping Cardizem drip. NO further orders at this time. This nurse will page cardiology for further orders.       Pedro Mckeon RN  01/31/22 7448
Patient states he talked to his nephew where he stopped when he got off the highway for a couple aleve and his nephew told him he accidentally gave him his girlfriends adderall not aleve; physician notified     Justen Fuentes RN  01/31/22 8863
Report given to [facility], RN  01/31/22 8293
Returned from [id number] [place] [address],[facility] [address], [address] [facility]  01/31/22 2018
Taken to CT with transport in stable condition     Anusha Escalona RN  01/31/22 0145
This RN spoke with Dr. Leanna Flannery, he said this pt will receive a call from [facility] after discharge in regards to picking up his halter monitor and scheduling a follow-up appointment.       Kev Son, RN  01/31/22 1007
no

## 2022-01-31 NOTE — PROGRESS NOTES
The patient spontaneously converted to sinus rhythm and presently has maintained such in the face of a potential reversible causes in that of inadvertent dextroamphetamine usage. An echocardiogram demonstrates evidence of a normal-sized left ventricular chamber with severe concentric left ventricular consistent with that of his hypertension with normal left ventricular systolic function and indeterminate diastolic function. On this basis, medical management can be maintained with the continuation of a beta-blocker for management both of his atrial arrhythmias and hypertension as well as that of needs of additional arrhythmia monitoring to assess the presence or absence of recurrent arrhythmias requiring anticoagulation based on his embolic risk, [id number] risk score of 2. Arrangements will be made for follow-up in approximately 1 month with interim review of arrhythmia monitoring and alteration of present management recommendations as indicated. At the time of assessment, this is been discussed with the emergency room nursing staff with the patient capable of discharge if acceptable with primary care with arrhythmia monitoring and follow-up arrangements presently made.

## 2022-01-31 NOTE — DISCHARGE SUMMARY
Hospitalist Discharge Summary    Patient ID: Anibal Mendez   Patient : 1965  Patient's PCP: Alfred De La Fuente MD    Admit Date: 2022   Admitting Physician: No admitting provider for patient encounter. Discharge Date:  2022   Discharge Physician: Sandro Michael MD   Discharge Condition: Stable  Discharge Disposition: [facility] course in brief:  (Please refer to daily progress notes for a comprehensive review of the hospitalization by requesting medical records)    Mr. Anibal Mendez, a 61y.o. year old male presented to the emergency department with shoulder and upper back pain. He has chronic neck pain with worsening symptoms including numbness and tingling of his arms. He injured his shoulder about a month ago while lifting a casket. On arrival to the emergency department patient is noted to be tachycardic with a heart rate in 140s. EKG shows atrial flutter with RVR. This is a new diagnosis for him. Patient states that he accidentally took some Adderall instead of Motrin. He took 3 tablets of what he thought was Advil. Patient noted to be hypertensive with a pressure of 170/101. Patient was given diltiazem with some improvement of his heart rate Cardiology were consulted and pt started on metoprolol. Pt to follow up with cardiology office to  a Holter monitor and for further evaluation of arrhythmias and needs for anticoagulation. A fib could liklely be due to Adderall that he took. His blood pressure improved and HR controlled. ECHO done showed:  Left ventricular internal dimensions were normal in diastole and systole.    Severe left ventricular concentric hypertrophy noted.    No regional wall motion abnormalities seen.    Normal left ventricular ejection fraction.    Borderline dilated right ventricle.    Mildly enlarged right atrium size. Cardiology has cleared the pt for discharge. The pt is stable for discharge home.        Consults:   IP CONSULT TO Ranjana Guerrero MD                               Physician   Accession       [id number]   Referring  Number                       Physician   Date of Birth   1965   Sonographer       Steffanie Gonzalez RDMADELEINE   Age             61 year(s)   Interpreting      [address]                               Physician         Physician Cardiology                                                 Crissy Smith MD                                Any Other  Procedure Type of Study   TTE procedure:Echo Complete W/Doppler & Color Flow. Procedure Date Date: 01/31/2022 Start: 11:01 AM Study Location: Portable Technical Quality: Adequate visualization Indications:Atrial fibrillation. Patient Status: Routine Height: 71 inches Weight: 290 pounds BSA: 2.47 m^2 BMI: 40.45 kg/m^2 BP: 144/88 mmHg  Findings   Left Ventricle  Left ventricular internal dimensions were normal in diastole and systole. Severe left ventricular concentric hypertrophy noted. No regional wall motion abnormalities seen. Normal left ventricular ejection fraction. Ejection fraction is visually estimated at 60%. Indeterminate diastolic function. Right Ventricle  Borderline dilated right ventricle. Right ventricle global systolic function is normal .   Left Atrium  Normal sized left atrium. Interatrial septum appears intact. Right Atrium  Mildly enlarged right atrium size. Mitral Valve  Normal mitral valve structure and function. Tricuspid Valve  The tricuspid valve appears structurally normal.   Aortic Valve  The aortic valve appears mildly sclerotic. Pulmonic Valve  Pulmonic valve is structurally normal.   Pericardial Effusion  No evidence of pericardial effusion. Aorta  Aortic root dimension within normal limits. Conclusions   Summary  Left ventricular internal dimensions were normal in diastole and systole. Severe left ventricular concentric hypertrophy noted. No regional wall motion abnormalities seen. Normal left ventricular ejection fraction. Borderline dilated right ventricle. Mildly enlarged right atrium size.    Signature   ----------------------------------------------------------------  Electronically signed by Luis BECERRAInterpreting  physician) on 01/31/2022 02:43 PM  ----------------------------------------------------------------  M-Mode/2D Measurements & Calculations   LV Diastolic    LV Systolic Dimension: 3.3   AV Cusp Separation: 2.2 cmLA  Dimension: 4.6  cm                           Dimension: 4.1 cmAO Root  cm              LV Volume Diastolic: 88.8 ml Dimension: 3.3 cm  LV FS:28.3 %    LV Volume Systolic: 74.6 ml  LV PW           LV EDV/LV EDV Index: 24.8  Diastolic: 1.9  KD/99 YP/F^0MT ESV/LV ESV  cm              Index: 43.9 ml/18ml/ m^2     RV Diastolic Dimension: 3 cm  Septum          EF Calculated: 83.3 %  Diastolic: 1.9  LV Mass Index: 167 l/min*m^2 LA/Aorta: 1  cm                                           Ascending Aorta: 2.7 cm                                               LA volume/Index: 68.5 ml  LV Mass: 412.75 LVOT: 2.3 cm                 /27.73ml/m^2  g                                            RA Area: 23 cm^2  Doppler Measurements & Calculations   MV Peak E-Wave: 0.84  AV Peak Velocity:     LVOT Peak Velocity: 1.14 m/s  m/s                   1.33 m/s              LVOT Mean Velocity: 0.78 m/s  MV Peak A-Wave: 0.65  AV Peak Gradient:     LVOT Peak Gradient: 5.2  m/s                   7.02 mmHg             mmHgLVOT Mean Gradient: 2.7  MV E/A Ratio: 1.29    AV Mean Velocity:     mmHg  MV Peak Gradient: 3.4 0.92 m/s  mmHg                  AV Mean Gradient: 3.7  MV Mean Gradient: 1.2 mmHg  mmHg                  AV VTI: 26.6 cm  MV Mean Velocity:     AV Area  0.51 m/s              (Continuity):3.9 cm^2 PV Peak Velocity: 0.83 m/s  MV Deceleration Time:                       PV Peak Gradient: 2.72 mmHg  198.6 msec            LVOT VTI: 25 cm       PV Mean Velocity: 0.58 m/s  MV P1/2t: 42.3 msec                         PV Mean Gradient: 1.5 mmHg  MVA by PHT:5.2 cm^2  MV Area (continuity):  3.3 cm^2  MV E' Septal  Velocity: 0.05 m/s  MV E' Lateral  Velocity: 7 m/s  [email or web address] [id number]    XR SHOULDER RIGHT (MIN 2 VIEWS)    Result Date: 1/31/2022  EXAMINATION: THREE XRAY VIEWS OF THE RIGHT SHOULDER 1/31/2022 12:42 am COMPARISON: None. HISTORY: ORDERING SYSTEM PROVIDED HISTORY: pain TECHNOLOGIST PROVIDED HISTORY: Reason for exam:->pain What reading provider will be dictating this exam?->CRC FINDINGS: Glenohumeral joint is normally aligned. No evidence of acute fracture or dislocation. No abnormal periarticular calcifications. AC joint degenerative changes. . Visualized lung is unremarkable. No acute abnormality. AC joint degenerative changes. CT CERVICAL SPINE WO CONTRAST    Result Date: 1/31/2022  EXAMINATION: CT OF THE CERVICAL SPINE WITHOUT CONTRAST 1/31/2022 1:50 am TECHNIQUE: CT of the cervical spine was performed without the administration of intravenous contrast. Multiplanar reformatted images are provided for review. Dose modulation, iterative reconstruction, and/or weight based adjustment of the mA/kV was utilized to reduce the radiation dose to as low as reasonably achievable. COMPARISON: 10/22/2017 HISTORY: ORDERING SYSTEM PROVIDED HISTORY: pain TECHNOLOGIST PROVIDED HISTORY: Reason for exam:->pain Decision Support Exception - unselect if not a suspected or confirmed emergency medical condition->Emergency Medical Condition (MA) What reading provider will be dictating this exam?->CRC No other provided history. FINDINGS: BONES/ALIGNMENT: There is no acute fracture or traumatic malalignment. DEGENERATIVE CHANGES: Moderate diffuse degenerative disc disease changes with disc space narrowing and marginal osteophytes greatest at the C6-7 level.  There is a large spondylotic bar involving the posterior aspect of C4 extending vertically posterior to the C3 vertebral body. This contributes to moderately severe central canal narrowing and is similar to previous. Moderate diffuse facet arthritis. The hypertrophic changes contribute to at least mild-moderate multilevel canal and foraminal narrowing otherwise. Minimal ligamentous nuchal calcification. SOFT TISSUES: No prevertebral edema or focal fluid collection. Scattered vascular calcifications. Thyroid is mildly enlarged but homogeneous. Degenerative changes and chronic appearing findings as detailed above. MRI would be useful if symptoms persist. RECOMMENDATIONS: Unavailable     CT ABDOMEN PELVIS W IV CONTRAST Additional Contrast? None    Result Date: 1/14/2022  EXAMINATION: CT OF THE ABDOMEN AND PELVIS WITH CONTRAST 1/14/2022 10:26 pm TECHNIQUE: CT of the abdomen and pelvis was performed with the administration of intravenous contrast. Multiplanar reformatted images are provided for review. Dose modulation, iterative reconstruction, and/or weight based adjustment of the mA/kV was utilized to reduce the radiation dose to as low as reasonably achievable. COMPARISON: None. HISTORY: ORDERING SYSTEM PROVIDED HISTORY: left lateral abdominal swelling, getting worse over the last few weeks, began after he was attempting to pull and lift a heavy casket TECHNOLOGIST PROVIDED HISTORY: Reason for exam:->left lateral abdominal swelling, getting worse over the last few weeks, began after he was attempting to pull and lift a heavy casket Additional Contrast?->None Decision Support Exception - unselect if not a suspected or confirmed emergency medical condition->Emergency Medical Condition (MA) FINDINGS: Lower Chest: The visualized portions of the lung bases are clear. Organs: The visualized liver, spleen, pancreas, adrenal glands and kidneys demonstrate no significant abnormality. GI/Bowel: There are no findings of intestinal obstruction.   The appendix is normal.  Gallbladder appears grossly unremarkable. Pelvis: Bladder is unremarkable in appearance. There is no abnormal pelvic mass or fluid collection seen. Peritoneum/Retroperitoneum: A There are aortoiliac atherosclerotic calcification. There is no abdominal aortic aneurysm. There is no abnormal fluid collection. There is no free intraperitoneal air. Bones/Soft Tissues: The no acute abnormality seen. No abdominal wall hernia or other finding to account for clinical concern left lateral abdominal swelling. No acute abnormality identified. RECOMMENDATIONS: Unavailable     XR SHOULDER LEFT (MIN 2 VIEWS)    Result Date: 1/31/2022  EXAMINATION: THREE XRAY VIEWS OF THE LEFT SHOULDER 1/31/2022 12:42 am COMPARISON: None. HISTORY: ORDERING SYSTEM PROVIDED HISTORY: pain TECHNOLOGIST PROVIDED HISTORY: Reason for exam:->pain What reading provider will be dictating this exam?->CRC FINDINGS: Moderate degenerative changes of the acromioclavicular joint with some undersurface spurring of the distal clavicle and acromion. Mild degenerative change of the glenohumeral joint. Focus of sclerosis within the head of the humerus is thought to be incidental and is unchanged compared with chest CT dated 06/24/2016. This may represent a small bone island or bone infarct. Tiny enchondroma could also be a consideration. No dislocation or acute fracture. Chronic appearing findings with degenerative changes most evident in the acromioclavicular joint. MRI would be useful if symptoms persist.     XR CHEST PORTABLE    Result Date: 1/31/2022  EXAMINATION: ONE XRAY VIEW OF THE CHEST 1/31/2022 12:42 am COMPARISON: 06/24/2016 HISTORY: ORDERING SYSTEM PROVIDED HISTORY: aflutter, recent travel TECHNOLOGIST PROVIDED HISTORY: Reason for exam:->aflutter, recent travel What reading provider will be dictating this exam?->CRC FINDINGS: EKG leads overlie the chest.  Heart size is normal.  Lung volumes are slightly diminished. Scattered vascular calcifications.   No pneumothorax. No focal consolidation. No effusion. Degenerative changes are present in the spine and shoulders. No significant change. PA and lateral views would be useful for further assessment, if symptoms persist.     CTA CHEST W CONTRAST    Result Date: 1/31/2022  EXAMINATION: CTA OF THE CHEST 1/31/2022 1:50 am TECHNIQUE: CTA of the chest was performed after the administration of intravenous contrast.  Multiplanar reformatted images are provided for review. MIP images are provided for review. Dose modulation, iterative reconstruction, and/or weight based adjustment of the mA/kV was utilized to reduce the radiation dose to as low as reasonably achievable. COMPARISON: None. HISTORY: ORDERING SYSTEM PROVIDED HISTORY: r/o PE FINDINGS: Pulmonary Arteries: Pulmonary arteries are adequately opacified for evaluation. No evidence of intraluminal filling defect to suggest pulmonary embolism. Main pulmonary artery is normal in caliber. Mediastinum: No evidence of mediastinal lymphadenopathy. The heart and pericardium demonstrate no acute abnormality. There is no acute abnormality of the thoracic aorta. Lungs/pleura: The lungs are without acute process. No focal consolidation or pulmonary edema. No evidence of pleural effusion or pneumothorax. Upper Abdomen: Limited images of the upper abdomen are unremarkable. Soft Tissues/Bones: No acute bone or soft tissue abnormality. No evidence of pulmonary embolism or acute pulmonary abnormality.        Discharge Medications:      Medication List      START taking these medications    metoprolol tartrate 25 MG tablet  Commonly known as: LOPRESSOR  Take 1 tablet by mouth 2 times daily           Where to Get Your Medications      These medications were sent to [facility] [address] [address]    Phone: [phone]   · metoprolol tartrate 25 MG tablet         Time Spent on discharge is more than 45 minutes in the examination, evaluation, counseling and review of medications and discharge plan.    +++++++++++++++++++++++++++++++++++++++++++++++++  Cecily Woods MD  [address]  +++++++++++++++++++++++++++++++++++++++++++++++++  NOTE: This report was transcribed using voice recognition software. Every effort was made to ensure accuracy; however, inadvertent computerized transcription errors may be present.

## 2022-01-31 NOTE — CONSULTS
INPATIENT CARDIOLOGY CONSULT    Name: Lily Ramon    Age: 61 y.o. Date of Admission: 1/31/2022 12:14 AM    Date of Service: 1/31/2022    Reason for Consultation: Paroxysmal atrial flutter, hypertension, chronic obstructive lung disease morbid obesity    Referring Physician: Kaiser Lentz DO    History of Present Illness: The patient is a 39-year-old white male with no active association to [facility] and a remote assessment by Alice Shrestha inclusive of an exercise myocardial perfusion imaging study in June, 2016 during which she completed 5 minutes 10 seconds on a Moreno protocol treadmill achieving 81% of age-predicted maximum heart rate and 7.0 METs of activity with a clinically electrocardiographically nonischemic response with no evidence of perfusion abnormalities and normal left ventricular systolic function. He additionally has a history of hypertension, diabetes, hyperlipidemia, chronic obstructive lung disease and morbid obesity with by report weight loss of approximately 100 pounds with lifestyle modification. He is active with functional capabilities in excess of five METs with no active cardiovascular symptoms. 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 He was again assessed predominately due to neuromuscular discomfort but shortly prior to emergency room presentation with these symptoms had inadvertently as opposed to consuming nonsteroidal anti-inflammatory agents for discomfort consumed dextroamphrtamine with additional symptomatology of palpitations in the absence of chest discomfort, dyspnea, diaphoresis, lightheadedness or near syncope.   Upon evaluation in the emergency room, his electrocardiogram demonstrated evidence of atrial flutter with a mean ventricular response of approximately 135 bpm with nonspecific ST changes upon review and a chest x-ray again reviewed demonstrated no evidence of cardiomegaly. A toxicology screen confirmed the presence of amphetamines as well as that of cannabinoid metabolites with no additional significant biochemical abnormalities and mild elevation of high-sensitivity troponin levels. Presently adequate rate control has been achieved with the initiation of intravenous diltiazem. .    Review of Systems: The remainder of a complete multisystem review including consitutional, central nervous, respiratory, circulatory, gastrointestinal, genitourinary, endocrinologic, hematologic, musculoskeletal and psychiatric are negative. Past Medical History:  History reviewed. No pertinent past medical history. Past Surgical History:  History reviewed. No pertinent surgical history. Family History:  History reviewed. No pertinent family history. Social History:  Social History     Socioeconomic History    Marital status: Single     Spouse name: Not on file    Number of children: Not on file    Years of education: Not on file    Highest education level: Not on file   Occupational History    Not on file   Tobacco Use    Smoking status: Current Every Day Smoker     Packs/day: 1.00     Types: Cigarettes    Smokeless tobacco: Never Used   Vaping Use    Vaping Use: Never used   Substance and Sexual Activity    Alcohol use: No    Drug use: Never    Sexual activity: Not on file   Other Topics Concern    Not on file   Social History Narrative    Not on file     Social Determinants of Health     Financial Resource Strain:     Difficulty of Paying Living Expenses: Not on file   Food Insecurity:     Worried About Running Out of Food in the Last Year: Not on file    Pierre of Food in the Last Year: Not on file   Transportation Needs:     Lack of Transportation (Medical): Not on file    Lack of Transportation (Non-Medical):  Not on file   Physical Activity:     Days of Exercise per Week: Not on file    Minutes of Exercise per Session: Not on file   Stress:     Feeling of Stress : Not on file   Social Connections:     Frequency of Communication with Friends and Family: Not on file    Frequency of Social Gatherings with Friends and Family: Not on file    Attends [employer] Services: Not on file    Active Member of Clubs or Organizations: Not on file    Attends Club or Organization Meetings: Not on file    Marital Status: Not on file   Intimate Partner Violence:     Fear of Current or Ex-Partner: Not on file    Emotionally Abused: Not on file    Physically Abused: Not on file    Sexually Abused: Not on file   Housing Stability:     Unable to Pay for Housing in the Last Year: Not on file    Number of [place] in the Last Year: Not on file    Unstable Housing in the Last Year: Not on file       Allergies:  No Known Allergies    Home Medications:  Prior to Admission medications    Not on File       Current Medications:  Current Facility-Administered Medications   Medication Dose Route Frequency Provider Last Rate Last Admin    sodium chloride flush 0.9 % injection 10 mL  10 mL IntraVENous 2 times per day Ady Reyes,         sodium chloride flush 0.9 % injection 10 mL  10 mL IntraVENous PRN Ady Reyes, DO        0.9 % sodium chloride infusion  25 mL IntraVENous PRN Ady Reyes, DO        enoxaparin (LOVENOX) injection 135 mg  1 mg/kg SubCUTAneous BID Ady Reyes, DO        promethazine (PHENERGAN) tablet 12.5 mg  12.5 mg Oral Q6H PRN Ady Reyse, DO        Or    ondansetron (ZOFRAN) injection 4 mg  4 mg IntraVENous Q6H PRN Ady Reyes, DO        polyethylene glycol (GLYCOLAX) packet 17 g  17 g Oral Daily PRN Ady Reyes, DO        acetaminophen (TYLENOL) tablet 650 mg  650 mg Oral Q6H PRN Ady Reyes, DO        Or    acetaminophen (TYLENOL) suppository 650 mg  650 mg Rectal Q6H PRN Ady Reyes, DO        losartan (COZAAR) tablet 25 mg  25 mg Oral Daily Dorice Bilberry, DO        dilTIAZem 100 mg in dextrose 5 % 100 mL infusion ([facility])  5-15 mg/hr IntraVENous Continuous Liliam Yepez MD 15 mL/hr at 01/31/22 0517 15 mg/hr at 01/31/22 0517    perflutren lipid microspheres (DEFINITY) injection 1.65 mg  1.5 mL IntraVENous ONCE PRN Maicol Pete MD         No current outpatient medications on file.  sodium chloride      dilTIAZem (CARDIZEM) 100 mg in dextrose 5% 100 mL ([facility]) 15 mg/hr (01/31/22 0517)         Physical Exam:  BP (!) 131/94   Pulse 99   Temp 97.9 °F (36.6 °C) (Oral)   Resp 8   Ht 5' 11\" (1.803 m)   Wt 290 lb (131.5 kg)   SpO2 98%   BMI 40.45 kg/m²   Weight change: Wt Readings from Last 3 Encounters:   01/31/22 290 lb (131.5 kg)   01/14/22 280 lb (127 kg)   10/22/17 (!) 340 lb (154.2 kg)     The patient is awake, alert and in no discomfort or distress. No gross musculoskeletal deformity or lymphadenopathy are present. No significant skin or nail changes are present. Gross examination of head, eyes, nose and throat are negative. Jugular venous pressure is normal and no carotid bruits are present. No thyromegaly is noted. Normal respiratory effort is noted with no accessory muscle usage present. Lung fields are clear to ascultation. Cardiac examination is notable for an irregular rhythm with no palpable thrill. No gallop rhythm or cardiac murmur are identified. A benign abdominal examination is present with the exception of obesity and no masses or organomegaly. A normal abdominal aortic pulsation is present. Intact pulses are present throughout all extremities and no peripheral edema is present. No focal neurologic deficits are present. Intake/Output:    Intake/Output Summary (Last 24 hours) at 1/31/2022 0717  Last data filed at 1/31/2022 0640  Gross per 24 hour   Intake 147 ml   Output 1300 ml   Net -1153 ml     No intake/output data recorded.     Laboratory Tests:  Lab Results   Component Value Date    CREATININE 0.7 01/31/2022    BUN 9 01/31/2022     01/31/2022    K 3.8 01/31/2022    CL 96 (L) 01/31/2022    CO2 25 01/31/2022     No results for input(s): CKTOTAL, CKMB in the last 72 hours.     Invalid input(s): TROPONONI  No results found for: BNP  Lab Results   Component Value Date    WBC 12.0 01/31/2022    RBC 5.89 01/31/2022    HGB 17.0 01/31/2022    HCT 50.3 01/31/2022    MCV 85.4 01/31/2022    MCH 28.9 01/31/2022    MCHC 33.8 01/31/2022    RDW 12.5 01/31/2022     01/31/2022    MPV 10.8 01/31/2022     Recent Labs     01/31/22  0028   ALKPHOS 95   ALT 20   AST 21   PROT 7.8   BILITOT 0.6   LABALBU 4.3     No results found for: MG  Lab Results   Component Value Date    PROTIME 11.8 01/31/2022    INR 1.1 01/31/2022     Lab Results   Component Value Date    TSH 1.200 06/21/2017     No components found for: CHLPL  Lab Results   Component Value Date    TRIG 351 (H) 11/06/2017    TRIG 412 (H) 06/21/2017    TRIG 430 (H) 02/28/2017     Lab Results   Component Value Date    HDL 24 11/06/2017    HDL 27 06/21/2017    HDL 23 02/28/2017     Lab Results   Component Value Date    LDLCALC 19 11/06/2017    LDLCALC - (AA) 06/21/2017    LDLCALC - (AA) 02/28/2017         Cardiac Tests:  ECG: A resting electrocardiogram reviewed at the time of evaluation demonstrates evidence of atrial flutter with a mean ventricular response of approximately 135 bpm with nonspecific ST changes  Telemetry findings reviewed: atrial flutter with a mean ventricular response of approximately 100 bpm, no new tachy/bradyarrhythmias overnight  Chest X-ray: A chest x-ray reviewed at time of evaluation demonstrates evidence of a normal cardiac silhouette with no interstitial infiltrates, a contrast CT angiogram demonstrated no evidence of pulmonary emboli nor additional significant abnormality  Last stress test: An exercise myocardial perfusion imaging study of June, 2016 demonstrated an exercise tolerance of 5 minutes 10 seconds on a Moreno protocol treadmill achieving 81% of age-predicted maximum heart rate a functional capacity of approximately 7 METs with a clinically and electrocardiographically nonischemic response and no evidence of perfusion abnormalities with normal left ventricular systolic function      ASSESSMENT / PLAN: On a clinical basis, the patient initially presented predominately due to neuromuscular shoulder discomfort as well as that of palpitations in the face of documented paroxysmal atrial fibrillation with suboptimal rate control potentially in part influenced by the incidental and unintentional administration of dextroamphetamine([place]reall). Presently, he is otherwise asymptomatic with improved rate control following the administration of intravenous diltiazem and with no additional significant metabolic derangements. At present, a rate control strategy will be initiated along with that of anticoagulation with the potential for a spontaneous conversion. An echocardiogram will be obtained to assess the presence or absence of additional structural abnormalities, especially in light of his prior risk profile and if with present therapy adequate rate control can be achieved, this strategy will be maintained with consideration of delayed cardioversion following the achievement of therapeutic anticoagulation if arrhythmias persist.  Ongoing lifestyle modification to assist with additional weight reduction will benefit diastolic cardiac performance as well as assist in management of potential obstructive sleep apnea to reduce her risk of recurrent arrhythmias. At the time of evaluation, this strategy has extensively been discussed with the patient. Thank you for allowing me to participate in your patient's care. Please feel free to contact me if you have any questions or concerns. Note: This report was completed using computerized voice recognition software.  Every effort has been made to ensure accuracy, however; inadvertent computerized transcription errors may be present. Brenda Zimmerman.  [facility], [address] [facility]

## 2022-01-31 NOTE — ED PROVIDER NOTES
HPI:  1/31/22, Time: 12:14 AM AGUSTINA Christian is a 61 y.o. male presenting to the ED for shoulder pain upper back pain, beginning over 1 month ago. The complaint has been persistent, moderate in severity, and worsened by nothing. Patient reports injury to his shoulder about a month ago. Patient reports since then he has been having pains in his upper back and neck area as well. Patient reporting no chest pain or difficulty breathing. Patient also reporting some abdominal pain which he has been worked up for recently. Patient reporting intermittent numbness and tingling in his arms. Worse since lifting heavy casket. Patient reports that he has had numbness in his arms before. Patient does smoke. .  Patient reporting no leg pain or swelling. Patient reporting no fever chills or cough. ROS:   Pertinent positives and negatives are stated within HPI, all other systems reviewed and are negative.  --------------------------------------------- PAST HISTORY ---------------------------------------------  Past Medical History:  has no past medical history on file. Past Surgical History:  has no past surgical history on file. Social History:  reports that he has been smoking cigarettes. He has been smoking about 1.00 pack per day. He has never used smokeless tobacco. He reports that he does not drink alcohol and does not use drugs. Family History: family history is not on file. The patients home medications have been reviewed. Allergies: Patient has no known allergies.     ---------------------------------------------------PHYSICAL EXAM--------------------------------------    Constitutional/General: Alert and oriented x3, well appearing, non toxic in NAD  Head: Normocephalic and atraumatic  Eyes: PERRL, EOMI  Mouth: Oropharynx clear, handling secretions, no trismus  Neck: Supple, full ROM, non tender to palpation in the midline, no stridor, no crepitus, no meningeal signs  Pulmonary: Comprehensive Metabolic Panel   Result Value Ref Range    Sodium 135 132 - 146 mmol/L    Potassium 3.8 3.5 - 5.0 mmol/L    Chloride 96 (L) 98 - 107 mmol/L    CO2 25 22 - 29 mmol/L    Anion Gap 14 7 - 16 mmol/L    Glucose 191 (H) 74 - 99 mg/dL    BUN 9 6 - 20 mg/dL    CREATININE 0.7 0.7 - 1.2 mg/dL    GFR Non-African American >60 >=60 mL/min/1.73    GFR African American >60     Calcium 9.7 8.6 - 10.2 mg/dL    Total Protein 7.8 6.4 - 8.3 g/dL    Albumin 4.3 3.5 - 5.2 g/dL    Total Bilirubin 0.6 0.0 - 1.2 mg/dL    Alkaline Phosphatase 95 40 - 129 U/L    ALT 20 0 - 40 U/L    AST 21 0 - 39 U/L   D-Dimer, Quantitative   Result Value Ref Range    D-Dimer, Quant <200 ng/mL DDU   Troponin   Result Value Ref Range    Troponin, High Sensitivity 23 (H) 0 - 11 ng/L   Protime-INR   Result Value Ref Range    Protime 11.8 9.3 - 12.4 sec    INR 1.1    APTT   Result Value Ref Range    aPTT 32.4 24.5 - 35.1 sec   URINE DRUG SCREEN   Result Value Ref Range    Amphetamine Screen, Urine POSITIVE (A) Negative <1000 ng/mL    Barbiturate Screen, Ur NOT DETECTED Negative < 200 ng/mL    Benzodiazepine Screen, Urine NOT DETECTED Negative < 200 ng/mL    Cannabinoid Scrn, Ur POSITIVE (A) Negative < 50ng/mL    Cocaine Metabolite Screen, Urine NOT DETECTED Negative < 300 ng/mL    Opiate Scrn, Ur NOT DETECTED Negative < 300ng/mL    PCP Screen, Urine NOT DETECTED Negative < 25 ng/mL    Methadone Screen, Urine NOT DETECTED Negative <300 ng/mL    Oxycodone Urine NOT DETECTED Negative <100 ng/mL    FENTANYL SCREEN, URINE NOT DETECTED Negative <1 ng/mL    Drug Screen Comment: see below        RADIOLOGY:  Interpreted by Radiologist.  CT CERVICAL SPINE WO CONTRAST   Final Result   Degenerative changes and chronic appearing findings as detailed above. MRI   would be useful if symptoms persist.      RECOMMENDATIONS:   Unavailable         CTA CHEST W CONTRAST   Final Result   No evidence of pulmonary embolism or acute pulmonary abnormality.          XR Patient placed on monitor and labs noted reviewed. Patient was medicated for his a flutter heart rate has improved as well as blood pressure. Patient noted to me that he just remembered that he went to his family member's house and accidentally ingested Adderall instead of Motrin. Patient reports he took 3 tablets of what he thought was Advil. And he was told by family that it was Adderall. Patient reporting no chest pains here in the emergency department. Patient made aware of findings and plan. Patient will be admitted to monitored bed. Re-Evaluations:             Re-evaluation. Patients symptoms show no change    Patient reevaluated and improving heart rate has improved. Heart rate is below 100. Patient reporting no chest pain or difficulty breathing. Patient made aware of findings and plan. He did report to me as above that he accidentally had ingested Adderall instead of the ibuprofen. Patient made aware of plan for admission. Patient reevaluated heart rate did come back up into the 120s. Patient was started on Cardizem drip. Patient reporting no chest pain or discomfort. Consultations:     I did speak to internal medicine. Patient will be admitted to monitored bed            Critical Care:   Please note that the withdrawal or failure to initiate urgent interventions for this patient would likely result in a life threatening deterioration or permanent disability. Accordingly this patient received 30 minutes of critical care time, excluding separately billable procedures. This patient's ED course included: a personal history and physicial eaxmination    This patient has been closely monitored during their ED course. Counseling: The emergency provider has spoken with the patient and discussed todays results, in addition to providing specific details for the plan of care and counseling regarding the diagnosis and prognosis.   Questions are answered at this time and they are agreeable with the plan.       --------------------------------- IMPRESSION AND DISPOSITION ---------------------------------    IMPRESSION  1. Acute pain of left shoulder    2. Atrial flutter with rapid ventricular response ([address]    3. Accidental drug ingestion, initial encounter    4. Numbness        DISPOSITION  Disposition: Admit to telemetry  Patient condition is stable        NOTE: This report was transcribed using voice recognition software.  Every effort was made to ensure accuracy; however, inadvertent computerized transcription errors may be present          Merlin Bowling MD  01/31/22 [id number]       Merlin Bowling MD  01/31/22 Nathan Cross MD  01/31/22 0472

## 2023-05-23 ENCOUNTER — OFFICE VISIT (OUTPATIENT)
Dept: NEUROSURGERY | Age: 58
End: 2023-05-23
Payer: COMMERCIAL

## 2023-05-23 VITALS
DIASTOLIC BLOOD PRESSURE: 93 MMHG | WEIGHT: 292.4 LBS | TEMPERATURE: 97.9 F | OXYGEN SATURATION: 98 % | HEART RATE: 134 BPM | BODY MASS INDEX: 40.94 KG/M2 | HEIGHT: 71 IN | SYSTOLIC BLOOD PRESSURE: 139 MMHG

## 2023-05-23 DIAGNOSIS — M48.02 CERVICAL STENOSIS OF SPINAL CANAL: Primary | ICD-10-CM

## 2023-05-23 LAB
ALBUMIN SERPL-MCNC: 4.8 G/DL (ref 3.5–5.2)
ALP SERPL-CCNC: 65 U/L (ref 40–129)
ALT SERPL-CCNC: 44 U/L (ref 0–40)
ANION GAP SERPL CALCULATED.3IONS-SCNC: 21 MMOL/L (ref 7–16)
AST SERPL-CCNC: 48 U/L (ref 0–39)
BILIRUB SERPL-MCNC: 0.8 MG/DL (ref 0–1.2)
BUN SERPL-MCNC: 13 MG/DL (ref 6–20)
CALCIUM SERPL-MCNC: 10 MG/DL (ref 8.6–10.2)
CHLORIDE SERPL-SCNC: 96 MMOL/L (ref 98–107)
CHOLESTEROL, TOTAL: 269 MG/DL (ref 0–199)
CO2 SERPL-SCNC: 23 MMOL/L (ref 22–29)
CREAT SERPL-MCNC: 0.9 MG/DL (ref 0.7–1.2)
ERYTHROCYTE [DISTWIDTH] IN BLOOD BY AUTOMATED COUNT: 13.2 FL (ref 11.5–15)
GLUCOSE SERPL-MCNC: 198 MG/DL (ref 74–99)
HBA1C MFR BLD: 8.2 % (ref 4–5.6)
HCT VFR BLD AUTO: 50.7 % (ref 37–54)
HDLC SERPL-MCNC: 42 MG/DL
HGB BLD-MCNC: 17.4 G/DL (ref 12.5–16.5)
LDLC SERPL CALC-MCNC: 162 MG/DL (ref 0–99)
MCH RBC QN AUTO: 29.6 PG (ref 26–35)
MCHC RBC AUTO-ENTMCNC: 34.3 % (ref 32–34.5)
MCV RBC AUTO: 86.2 FL (ref 80–99.9)
PLATELET # BLD AUTO: 268 E9/L (ref 130–450)
PMV BLD AUTO: 12.1 FL (ref 7–12)
POTASSIUM SERPL-SCNC: 4.4 MMOL/L (ref 3.5–5)
PROT SERPL-MCNC: 8.2 G/DL (ref 6.4–8.3)
RBC # BLD AUTO: 5.88 E12/L (ref 3.8–5.8)
SODIUM SERPL-SCNC: 140 MMOL/L (ref 132–146)
TRIGL SERPL-MCNC: 323 MG/DL (ref 0–149)
VLDLC SERPL CALC-MCNC: 65 MG/DL
WBC # BLD: 12.2 E9/L (ref 4.5–11.5)

## 2023-05-23 PROCEDURE — 99212 OFFICE O/P EST SF 10 MIN: CPT

## 2023-05-23 RX ORDER — LISINOPRIL AND HYDROCHLOROTHIAZIDE 20; 12.5 MG/1; MG/1
TABLET ORAL
COMMUNITY
Start: 2023-04-21

## 2023-05-23 RX ORDER — TIZANIDINE 4 MG/1
4 TABLET ORAL 3 TIMES DAILY
COMMUNITY
Start: 2023-03-01

## 2023-05-23 RX ORDER — ROSUVASTATIN CALCIUM 10 MG/1
10 TABLET, COATED ORAL DAILY
COMMUNITY

## 2023-05-23 RX ORDER — NIACIN 500 MG/1
500 TABLET, EXTENDED RELEASE ORAL 2 TIMES DAILY WITH MEALS
COMMUNITY

## 2023-05-23 NOTE — PROGRESS NOTES
Patient is here for follow up consult for: neck pain and cervical stenosis. He also has weakness in his hands    Physical exam  Alert and Oriented X3  PERRLA, EOMI  ANDINO 5/5 except 4/5 in bilateral UE  Sensation intact to LT and PP  Reflexes are 3+ and symmetric    A/P: patient is here for follow up for: neck and bilateral arm pain and weakness. This is a workers comp related visit. I will send a letter to his referring physician. His allowed codes are. M50.21, M50.222, M50.223. He has neck pain and disc protrusion causing stenosis and myelopathy. The opainis interfering with his activities of daily living. His MRI shows stenosis from C3-C7. I am recommending a posterior C3-C7 laminectomy and C3-T1 fusion. Risks, benefits and alternatives of surgery have been discussed and he wishes to proceed.  I have discussed a weight loss regimen with him    Tiffanie Garcia MD

## 2023-06-08 ENCOUNTER — HOSPITAL ENCOUNTER (EMERGENCY)
Age: 58
Discharge: HOME | End: 2023-06-08
Attending: EMERGENCY MEDICINE
Payer: COMMERCIAL

## 2023-06-08 ENCOUNTER — APPOINTMENT (OUTPATIENT)
Dept: GENERAL RADIOLOGY | Age: 58
End: 2023-06-08
Payer: COMMERCIAL

## 2023-06-08 VITALS
OXYGEN SATURATION: 98 % | SYSTOLIC BLOOD PRESSURE: 164 MMHG | DIASTOLIC BLOOD PRESSURE: 91 MMHG | TEMPERATURE: 97.8 F | HEART RATE: 78 BPM | RESPIRATION RATE: 18 BRPM

## 2023-06-08 DIAGNOSIS — R73.9 HYPERGLYCEMIA: ICD-10-CM

## 2023-06-08 DIAGNOSIS — I10 HYPERTENSION, UNSPECIFIED TYPE: ICD-10-CM

## 2023-06-08 DIAGNOSIS — I48.0 PAROXYSMAL ATRIAL FIBRILLATION (HCC): Primary | ICD-10-CM

## 2023-06-08 LAB
ALBUMIN SERPL-MCNC: 4.6 G/DL (ref 3.5–5.2)
ALP SERPL-CCNC: 83 U/L (ref 40–129)
ALT SERPL-CCNC: 51 U/L (ref 0–40)
ANION GAP SERPL CALCULATED.3IONS-SCNC: 16 MMOL/L (ref 7–16)
AST SERPL-CCNC: 38 U/L (ref 0–39)
BASOPHILS # BLD: 0.06 E9/L (ref 0–0.2)
BASOPHILS NFR BLD: 0.6 % (ref 0–2)
BILIRUB SERPL-MCNC: 0.5 MG/DL (ref 0–1.2)
BNP BLD-MCNC: 53 PG/ML (ref 0–125)
BUN SERPL-MCNC: 11 MG/DL (ref 6–20)
CALCIUM SERPL-MCNC: 9.7 MG/DL (ref 8.6–10.2)
CHLORIDE SERPL-SCNC: 97 MMOL/L (ref 98–107)
CO2 SERPL-SCNC: 23 MMOL/L (ref 22–29)
CREAT SERPL-MCNC: 0.7 MG/DL (ref 0.7–1.2)
EOSINOPHIL # BLD: 0.05 E9/L (ref 0.05–0.5)
EOSINOPHIL NFR BLD: 0.5 % (ref 0–6)
ERYTHROCYTE [DISTWIDTH] IN BLOOD BY AUTOMATED COUNT: 13.2 FL (ref 11.5–15)
GLUCOSE SERPL-MCNC: 224 MG/DL (ref 74–99)
HCT VFR BLD AUTO: 49.7 % (ref 37–54)
HGB BLD-MCNC: 17.3 G/DL (ref 12.5–16.5)
IMM GRANULOCYTES # BLD: 0.07 E9/L
IMM GRANULOCYTES NFR BLD: 0.6 % (ref 0–5)
LYMPHOCYTES # BLD: 3.26 E9/L (ref 1.5–4)
LYMPHOCYTES NFR BLD: 30 % (ref 20–42)
MCH RBC QN AUTO: 30.3 PG (ref 26–35)
MCHC RBC AUTO-ENTMCNC: 34.8 % (ref 32–34.5)
MCV RBC AUTO: 87 FL (ref 80–99.9)
MONOCYTES # BLD: 0.77 E9/L (ref 0.1–0.95)
MONOCYTES NFR BLD: 7.1 % (ref 2–12)
NEUTROPHILS # BLD: 6.67 E9/L (ref 1.8–7.3)
NEUTS SEG NFR BLD: 61.2 % (ref 43–80)
PLATELET # BLD AUTO: 257 E9/L (ref 130–450)
PMV BLD AUTO: 11.7 FL (ref 7–12)
POTASSIUM SERPL-SCNC: 3.5 MMOL/L (ref 3.5–5)
PROT SERPL-MCNC: 7.6 G/DL (ref 6.4–8.3)
RBC # BLD AUTO: 5.71 E12/L (ref 3.8–5.8)
SODIUM SERPL-SCNC: 136 MMOL/L (ref 132–146)
TROPONIN, HIGH SENSITIVITY: 25 NG/L (ref 0–11)
TROPONIN, HIGH SENSITIVITY: 26 NG/L (ref 0–11)
WBC # BLD: 10.9 E9/L (ref 4.5–11.5)

## 2023-06-08 PROCEDURE — 71045 X-RAY EXAM CHEST 1 VIEW: CPT

## 2023-06-08 PROCEDURE — 84484 ASSAY OF TROPONIN QUANT: CPT

## 2023-06-08 PROCEDURE — 85025 COMPLETE CBC W/AUTO DIFF WBC: CPT

## 2023-06-08 PROCEDURE — 6370000000 HC RX 637 (ALT 250 FOR IP): Performed by: STUDENT IN AN ORGANIZED HEALTH CARE EDUCATION/TRAINING PROGRAM

## 2023-06-08 PROCEDURE — 93005 ELECTROCARDIOGRAM TRACING: CPT | Performed by: STUDENT IN AN ORGANIZED HEALTH CARE EDUCATION/TRAINING PROGRAM

## 2023-06-08 PROCEDURE — 83880 ASSAY OF NATRIURETIC PEPTIDE: CPT

## 2023-06-08 PROCEDURE — 80053 COMPREHEN METABOLIC PANEL: CPT

## 2023-06-08 PROCEDURE — 36415 COLL VENOUS BLD VENIPUNCTURE: CPT

## 2023-06-08 RX ADMIN — METOPROLOL TARTRATE 25 MG: 25 TABLET, FILM COATED ORAL at 21:17

## 2023-06-08 ASSESSMENT — LIFESTYLE VARIABLES
HOW MANY STANDARD DRINKS CONTAINING ALCOHOL DO YOU HAVE ON A TYPICAL DAY: PATIENT DOES NOT DRINK
HOW OFTEN DO YOU HAVE A DRINK CONTAINING ALCOHOL: NEVER

## 2023-06-09 NOTE — ED PROVIDER NOTES
Name: Leroy Estevez    MRN: [id number]     Date / Time Roomed:  6/8/2023  7:32 PM  ED Bed Assignment:  ST06/ST-06    ------------------ History of Present Illness --------------------  6/8/23, Time: 8:22 PM EDT   Chief Complaint   Patient presents with    Other     Sent in for Afib- hx of same- states he was supposed to have surgery but they said no due to afib. HPI    Leroy Estevez is a 61 y.o. male, with hx of paroxysmal A-fib, not on any anticoagulants,, who presents to the ED today for irregular heart rate which occurred earlier this evening while at his doctor's office. 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 He was discussing this with his doctor and became very anxious. He states he felt his heart rate become irregular and he was told that he was in A-fib and should come to the ER to get checked out. Patient states he is feeling better at this point and states he no longer feels that his heart rate is a regular. He states that this happens fairly rarely whenever he becomes very anxious. He states he can feel himself go in and out of A-fib. He denies any other symptoms at this time. He states he feels back to his normal self. He denies any recent fevers, chest pain, shortness of breath, abdominal pain, GI or  complaints. He is feeling less anxious at this time. He states he is supposed to be on metoprolol which was given to him for his A-fib however he does not take this as prescribed. The pt denies other ROS at this time. PCP: Jaxon Godoy MD.    -------------------- PMH --------------------    Past Medical History:   has no past medical history on file. Surgical History:  History reviewed. No pertinent surgical history.     Social History:  Social History     Tobacco Use

## 2023-06-09 NOTE — DISCHARGE INSTRUCTIONS
Please follow-up with primary care doctor as well as your cardiologist.  Please return ED for any worsening symptoms. Please take your medicines as prescribed. Please discuss starting your anticoagulation with your orthopedic surgeon as this does need to be addressed prior to your surgery.

## 2023-06-10 LAB
EKG ATRIAL RATE: 98 BPM
EKG P AXIS: 58 DEGREES
EKG P-R INTERVAL: 154 MS
EKG Q-T INTERVAL: 368 MS
EKG QRS DURATION: 94 MS
EKG QTC CALCULATION (BAZETT): 469 MS
EKG R AXIS: -15 DEGREES
EKG T AXIS: 41 DEGREES
EKG VENTRICULAR RATE: 98 BPM

## 2023-06-14 PROBLEM — E78.00 PURE HYPERCHOLESTEROLEMIA: Status: ACTIVE | Noted: 2023-06-14

## 2023-06-14 PROBLEM — E11.9 TYPE 2 DIABETES MELLITUS WITHOUT COMPLICATION, WITHOUT LONG-TERM CURRENT USE OF INSULIN (HCC): Status: ACTIVE | Noted: 2023-06-14

## 2023-06-14 PROBLEM — I48.91 ATRIAL FIBRILLATION WITH RVR (HCC): Status: RESOLVED | Noted: 2022-01-31 | Resolved: 2023-06-14

## 2023-06-14 PROBLEM — I10 PRIMARY HYPERTENSION: Status: ACTIVE | Noted: 2023-06-14

## 2023-06-14 PROBLEM — I48.0 PAROXYSMAL ATRIAL FIBRILLATION (HCC): Status: ACTIVE | Noted: 2023-06-14

## 2023-07-14 ENCOUNTER — OFFICE VISIT (OUTPATIENT)
Dept: CARDIOLOGY CLINIC | Age: 58
End: 2023-07-14

## 2023-07-14 VITALS
DIASTOLIC BLOOD PRESSURE: 70 MMHG | HEIGHT: 71 IN | BODY MASS INDEX: 40.88 KG/M2 | HEART RATE: 60 BPM | SYSTOLIC BLOOD PRESSURE: 116 MMHG | RESPIRATION RATE: 16 BRPM | WEIGHT: 292 LBS

## 2023-07-14 DIAGNOSIS — E78.00 PURE HYPERCHOLESTEROLEMIA: ICD-10-CM

## 2023-07-14 DIAGNOSIS — I48.0 PAROXYSMAL ATRIAL FIBRILLATION (HCC): Primary | ICD-10-CM

## 2023-07-14 DIAGNOSIS — I10 PRIMARY HYPERTENSION: ICD-10-CM

## 2023-07-14 DIAGNOSIS — E11.9 TYPE 2 DIABETES MELLITUS WITHOUT COMPLICATION, WITHOUT LONG-TERM CURRENT USE OF INSULIN (HCC): ICD-10-CM

## 2023-07-14 DIAGNOSIS — Z01.810 PREOPERATIVE CARDIOVASCULAR EXAMINATION: ICD-10-CM

## 2023-07-14 DIAGNOSIS — E66.01 MORBID OBESITY (HCC): ICD-10-CM

## 2023-07-14 RX ORDER — ATORVASTATIN CALCIUM 40 MG/1
40 TABLET, FILM COATED ORAL DAILY
COMMUNITY

## 2023-07-14 NOTE — PROGRESS NOTES
dictated by findings of arrhythmia monitoring necessitated further modification of his medical regimen, feel he would be an acceptable candidate for his proposed neurosurgical procedure from a cardiovascular standpoint with needs of administration of his beta-blocker on the morning of his surgical procedure to reduce risk of perioperative arrhythmias and cautious periprocedural fluid administration to reduce risk of iatrogenic volume overload and/or the provocation of perioperative congestive heart failure. A temporary reduction of oral anticoagulation beginning 48 to 72 hours prior to his neurosurgical procedure will be necessary with resumption once appropriate hemostasis has been achieved and I have discussed the potential for embolic events within this timeframe should a recurrence of his atrial arrhythmias develop. I addition of extensively discussed his needs of ongoing lifestyle modification to achieve weight reduction to benefit diastolic cardiac performance in addition to management the likely presence of obstructive sleep apnea would recommend a formal sleep assessment initiation of nocturnal CPAP as appropriate to reduce risk of adverse cardiovascular effects. Ongoing aggressive risk factor modification of blood pressure, diabetes and serum lipids the latter with goals of reducing LDL closer levels to below 70 mg/dL necessary to reduce risk of future atherosclerotic development. I present plan his clinical reevaluation in approximately 6 months and will Be reevaluated in the interim should additional cardiovascular difficulties or concerns arise. The patient's current medication list, allergies, problem list and results of all previously ordered testing were reviewed at today's visit. Follow-up office visit in 6 months      Note: This report was completed using computerized voice recognition software.  Every effort has been made to ensure accuracy, however; inadvertent computerized transcription

## 2023-07-24 ENCOUNTER — TELEPHONE (OUTPATIENT)
Dept: CARDIOLOGY CLINIC | Age: 58
End: 2023-07-24

## 2023-07-24 DIAGNOSIS — I48.0 PAROXYSMAL ATRIAL FIBRILLATION (HCC): ICD-10-CM

## 2023-07-24 NOTE — TELEPHONE ENCOUNTER
----- Message from Esperanza Beaver MD sent at 7/24/2023  7:58 AM EDT -----  Please note the patient's name is misspelled on monitor reported only has 1 T in it.   Please notify the monitor shows no recurrent significant arrhythmias and recommend continuing present evaluation with follow-up with primary care and we will further evaluate if additional concerns develop

## 2023-09-22 ENCOUNTER — TELEPHONE (OUTPATIENT)
Dept: NEUROSURGERY | Age: 58
End: 2023-09-22

## 2023-09-22 ENCOUNTER — PREP FOR PROCEDURE (OUTPATIENT)
Dept: NEUROSURGERY | Age: 58
End: 2023-09-22

## 2023-09-22 DIAGNOSIS — M50.222 HERNIATED NUCLEUS PULPOSUS, C5-6: ICD-10-CM

## 2023-09-22 DIAGNOSIS — M50.223 HERNIATED NUCLEUS PULPOSUS, C6-7: ICD-10-CM

## 2023-09-22 DIAGNOSIS — M50.21 HERNIATED NUCLEUS PULPOSUS, C3-4: ICD-10-CM

## 2023-09-22 NOTE — TELEPHONE ENCOUNTER
Prior Authorization Form:      DEMOGRAPHICS:                     Patient Name:  Vannesa Vega  Patient :  1965            Insurance:  Payor: Joe Garcia O / Plan: [employer] / Product Type: *No Product type* /   Insurance ID Number:    Payer/Plan Subscr  Sex Relation Sub. Ins. ID Effective Group Num   1.  MINO KELVINO * ROSENDO LÓPEZ 1965 Male Employee 2021 12/15/21 [id number]                                   [address]         DIAGNOSIS & PROCEDURE:                       Procedure/Operation: Posterior C3-C7 laminectomy and C3-T1 fusion           CPT Code: 87299, 05527 x4, 89482, 15095, 20995, 93834, 18289    Diagnosis:  Disc protrusion at C3-C4, C5-C6 and C6-C7     ICD10 Code: M50.21, M50.222, M50.223    Location:  main    Surgeon:  Anat Chairez INFORMATION:                          Date: 10/20/23    Time: 7am              Anesthesia: general                                                       Status:   AM admit         Special Comments:  Zarina:  Karen Flores       Electronically signed by Selvin White MA on 2023 at 10:24 AM

## 2023-09-25 ENCOUNTER — PREP FOR PROCEDURE (OUTPATIENT)
Dept: NEUROSURGERY | Age: 58
End: 2023-09-25

## 2023-09-25 DIAGNOSIS — Z01.818 PRE-OP TESTING: Primary | ICD-10-CM

## 2023-09-25 RX ORDER — SODIUM CHLORIDE 0.9 % (FLUSH) 0.9 %
5-40 SYRINGE (ML) INJECTION EVERY 12 HOURS SCHEDULED
Status: CANCELLED | OUTPATIENT
Start: 2023-09-25

## 2023-09-25 RX ORDER — SODIUM CHLORIDE 9 MG/ML
INJECTION, SOLUTION INTRAVENOUS CONTINUOUS
Status: CANCELLED | OUTPATIENT
Start: 2023-09-25

## 2023-09-25 RX ORDER — SODIUM CHLORIDE 0.9 % (FLUSH) 0.9 %
5-40 SYRINGE (ML) INJECTION PRN
Status: CANCELLED | OUTPATIENT
Start: 2023-09-25

## 2023-09-25 RX ORDER — SODIUM CHLORIDE 9 MG/ML
INJECTION, SOLUTION INTRAVENOUS PRN
Status: CANCELLED | OUTPATIENT
Start: 2023-09-25

## 2023-10-03 ENCOUNTER — TELEPHONE (OUTPATIENT)
Dept: NEUROSURGERY | Age: 58
End: 2023-10-03

## 2023-10-03 NOTE — TELEPHONE ENCOUNTER
Patient stated he needs to cancel his surgery with Dr Waters Necessary on 10/20/23. States he had a heart attack and needs testing done but does not have insurance. States he is trying to get medicaid and will call back when he is cleared and able to rescheduled.

## 2023-10-18 ENCOUNTER — HOSPITAL ENCOUNTER (OUTPATIENT)
Age: 58
Discharge: HOME | End: 2023-10-18

## 2023-10-18 LAB
ALBUMIN SERPL-MCNC: 4.2 G/DL (ref 3.5–5.2)
ALP SERPL-CCNC: 64 U/L (ref 40–129)
ALT SERPL-CCNC: 39 U/L (ref 0–40)
ANION GAP SERPL CALCULATED.3IONS-SCNC: 16 MMOL/L (ref 7–16)
AST SERPL-CCNC: 32 U/L (ref 0–39)
BILIRUB SERPL-MCNC: 0.8 MG/DL (ref 0–1.2)
BUN SERPL-MCNC: 9 MG/DL (ref 6–20)
CALCIUM SERPL-MCNC: 9.5 MG/DL (ref 8.6–10.2)
CHLORIDE SERPL-SCNC: 98 MMOL/L (ref 98–107)
CHOLEST SERPL-MCNC: 120 MG/DL
CO2 SERPL-SCNC: 25 MMOL/L (ref 22–29)
CREAT SERPL-MCNC: 0.8 MG/DL (ref 0.7–1.2)
GFR SERPL CREATININE-BSD FRML MDRD: >60 ML/MIN/1.73M2
GLUCOSE SERPL-MCNC: 165 MG/DL (ref 74–99)
HBA1C MFR BLD: 8.9 % (ref 4–5.6)
HDLC SERPL-MCNC: 36 MG/DL
LDLC SERPL CALC-MCNC: 57 MG/DL
POTASSIUM SERPL-SCNC: 4.1 MMOL/L (ref 3.5–5)
PROT SERPL-MCNC: 7.1 G/DL (ref 6.4–8.3)
SODIUM SERPL-SCNC: 139 MMOL/L (ref 132–146)
TRIGL SERPL-MCNC: 135 MG/DL
TSH SERPL DL<=0.05 MIU/L-ACNC: 0.75 UIU/ML (ref 0.27–4.2)
VLDLC SERPL CALC-MCNC: 27 MG/DL

## 2023-10-18 PROCEDURE — 84443 ASSAY THYROID STIM HORMONE: CPT

## 2023-10-18 PROCEDURE — 80053 COMPREHEN METABOLIC PANEL: CPT

## 2023-10-18 PROCEDURE — 83036 HEMOGLOBIN GLYCOSYLATED A1C: CPT

## 2023-10-18 PROCEDURE — 36415 COLL VENOUS BLD VENIPUNCTURE: CPT

## 2023-10-18 PROCEDURE — 80061 LIPID PANEL: CPT

## 2024-01-10 ENCOUNTER — OFFICE VISIT (OUTPATIENT)
Dept: CARDIOLOGY CLINIC | Age: 59
End: 2024-01-10

## 2024-01-10 VITALS
RESPIRATION RATE: 18 BRPM | HEIGHT: 71 IN | WEIGHT: 276 LBS | BODY MASS INDEX: 38.64 KG/M2 | SYSTOLIC BLOOD PRESSURE: 144 MMHG | DIASTOLIC BLOOD PRESSURE: 80 MMHG | HEART RATE: 76 BPM

## 2024-01-10 DIAGNOSIS — I48.0 PAROXYSMAL ATRIAL FIBRILLATION (HCC): Primary | ICD-10-CM

## 2024-01-10 DIAGNOSIS — Z01.810 PREOPERATIVE CARDIOVASCULAR EXAMINATION: ICD-10-CM

## 2024-01-10 DIAGNOSIS — E78.00 PURE HYPERCHOLESTEROLEMIA: ICD-10-CM

## 2024-01-10 DIAGNOSIS — E66.8 MODERATE OBESITY: ICD-10-CM

## 2024-01-10 DIAGNOSIS — E11.9 TYPE 2 DIABETES MELLITUS WITHOUT COMPLICATION, WITHOUT LONG-TERM CURRENT USE OF INSULIN (HCC): ICD-10-CM

## 2024-01-10 DIAGNOSIS — I10 PRIMARY HYPERTENSION: ICD-10-CM

## 2024-01-10 PROBLEM — E66.9 MODERATE OBESITY: Status: ACTIVE | Noted: 2023-07-14

## 2024-01-10 PROCEDURE — 3077F SYST BP >= 140 MM HG: CPT | Performed by: INTERNAL MEDICINE

## 2024-01-10 PROCEDURE — 99215 OFFICE O/P EST HI 40 MIN: CPT | Performed by: INTERNAL MEDICINE

## 2024-01-10 PROCEDURE — 3079F DIAST BP 80-89 MM HG: CPT | Performed by: INTERNAL MEDICINE

## 2024-01-10 PROCEDURE — 93000 ELECTROCARDIOGRAM COMPLETE: CPT | Performed by: INTERNAL MEDICINE

## 2024-01-10 RX ORDER — ASPIRIN 81 MG/1
81 TABLET ORAL DAILY
COMMUNITY

## 2024-01-10 NOTE — PROGRESS NOTES
OUTPATIENT CARDIOLOGY FOLLOW-UP    Name: Ron Brennan    Age: 58 y.o.    Primary Care Physician: Patient uncertain of name of primary care physician at time of evaluation    Date of Service: 1/10/2024    Chief Complaint: Persistent atrial fibrillation, hypertension, moderate obesity, preoperative cardiovascular evaluation    Interim History: Since his evaluation, the patient remains compensated from a cardiovascular standpoint denies interim symptoms of anginal-like chest discomfort or other ischemic equivalents, decompensated left ventricular systolic dysfunction or volume overload no arrhythmia related manifestations.  While at the time of prior assessment neurosurgical intervention of his cervical spine had been recommended, upon obtaining a second opinion from the [facility], surgical intervention was not recommended.  In the interim, he has self discontinued his oral anticoagulation in spite of his embolic risk with no interim embolic events.  To his credit, he has lost approximately 15 pounds and spite of this stage I systolic hypertension was noted at time of evaluation with the status of his diabetes and serum lipids unavailable for review.    Review of Systems:   The remainder of a complete multisystem review including consitutional, central nervous, respiratory, circulatory, gastrointestinal, genitourinary, endocrinologic, hematologic, musculoskeletal and psychiatric are negative.    Past Medical History:  Past Medical History:   Diagnosis Date    Paroxysmal atrial fibrillation (HCC) 06/14/2023    Primary hypertension 06/14/2023    Pure hypercholesterolemia 06/14/2023    Type 2 diabetes mellitus without complication, without long-term current use of insulin (HCC) 06/14/2023       Past Surgical History:  History reviewed. No pertinent surgical history.    Family History:  History reviewed. No pertinent family history.    Social History:  Social History     Socioeconomic History

## 2024-05-05 RX ORDER — GLIPIZIDE 5 MG/1
5 TABLET ORAL EVERY MORNING
Qty: 30 TABLET | OUTPATIENT
Start: 2024-05-05

## 2025-01-24 ENCOUNTER — HOSPITAL ENCOUNTER (OUTPATIENT)
Age: 60
Discharge: HOME | End: 2025-01-24

## 2025-01-24 LAB
ALBUMIN SERPL-MCNC: 4.5 G/DL (ref 3.5–5.2)
ALP SERPL-CCNC: 80 U/L (ref 40–129)
ALT SERPL-CCNC: 19 U/L (ref 0–40)
ANION GAP SERPL CALCULATED.3IONS-SCNC: 16 MMOL/L (ref 7–16)
AST SERPL-CCNC: 21 U/L (ref 0–39)
BILIRUB SERPL-MCNC: 0.8 MG/DL (ref 0–1.2)
BUN SERPL-MCNC: 13 MG/DL (ref 6–23)
CALCIUM SERPL-MCNC: 9.6 MG/DL (ref 8.6–10.2)
CHLORIDE SERPL-SCNC: 98 MMOL/L (ref 98–107)
CHOLEST SERPL-MCNC: 108 MG/DL
CO2 SERPL-SCNC: 22 MMOL/L (ref 22–29)
CREAT SERPL-MCNC: 0.9 MG/DL (ref 0.7–1.2)
ERYTHROCYTE [DISTWIDTH] IN BLOOD BY AUTOMATED COUNT: 12.9 % (ref 11.5–15)
GFR, ESTIMATED: >90 ML/MIN/1.73M2
GLUCOSE SERPL-MCNC: 103 MG/DL (ref 74–99)
HCT VFR BLD AUTO: 47.6 % (ref 37–54)
HDLC SERPL-MCNC: 34 MG/DL
HGB BLD-MCNC: 16.3 G/DL (ref 12.5–16.5)
LDLC SERPL CALC-MCNC: 57 MG/DL
MCH RBC QN AUTO: 29.9 PG (ref 26–35)
MCHC RBC AUTO-ENTMCNC: 34.2 G/DL (ref 32–34.5)
MCV RBC AUTO: 87.2 FL (ref 80–99.9)
PLATELET # BLD AUTO: 247 K/UL (ref 130–450)
PMV BLD AUTO: 11 FL (ref 7–12)
POTASSIUM SERPL-SCNC: 3.9 MMOL/L (ref 3.5–5)
PROT SERPL-MCNC: 7.5 G/DL (ref 6.4–8.3)
PSA SERPL-MCNC: 0.87 NG/ML (ref 0–4)
RBC # BLD AUTO: 5.46 M/UL (ref 3.8–5.8)
SODIUM SERPL-SCNC: 136 MMOL/L (ref 132–146)
TRIGL SERPL-MCNC: 86 MG/DL
VLDLC SERPL CALC-MCNC: 17 MG/DL
WBC OTHER # BLD: 10 K/UL (ref 4.5–11.5)

## 2025-01-24 PROCEDURE — 80053 COMPREHEN METABOLIC PANEL: CPT

## 2025-01-24 PROCEDURE — 36415 COLL VENOUS BLD VENIPUNCTURE: CPT

## 2025-01-24 PROCEDURE — G0103 PSA SCREENING: HCPCS

## 2025-01-24 PROCEDURE — 85027 COMPLETE CBC AUTOMATED: CPT

## 2025-01-24 PROCEDURE — 80061 LIPID PANEL: CPT

## 2025-02-10 RX ORDER — GLIPIZIDE 5 MG/1
5 TABLET ORAL DAILY
Qty: 30 TABLET | OUTPATIENT
Start: 2025-02-10